# Patient Record
Sex: MALE | Race: WHITE | NOT HISPANIC OR LATINO | Employment: FULL TIME | URBAN - METROPOLITAN AREA
[De-identification: names, ages, dates, MRNs, and addresses within clinical notes are randomized per-mention and may not be internally consistent; named-entity substitution may affect disease eponyms.]

---

## 2017-01-24 DIAGNOSIS — I10 BENIGN ESSENTIAL HYPERTENSION: ICD-10-CM

## 2017-01-24 RX ORDER — HYDROCHLOROTHIAZIDE 12.5 MG/1
CAPSULE, GELATIN COATED ORAL
Qty: 30 CAPSULE | Refills: 5 | Status: SHIPPED | OUTPATIENT
Start: 2017-01-24 | End: 2017-07-25 | Stop reason: SDUPTHER

## 2017-03-21 DIAGNOSIS — I10 BENIGN ESSENTIAL HYPERTENSION: ICD-10-CM

## 2017-03-21 RX ORDER — METOPROLOL SUCCINATE 100 MG/1
TABLET, EXTENDED RELEASE ORAL
Qty: 30 TABLET | Refills: 1 | Status: SHIPPED | OUTPATIENT
Start: 2017-03-21 | End: 2017-05-24 | Stop reason: SDUPTHER

## 2017-05-15 ENCOUNTER — OFFICE VISIT (OUTPATIENT)
Dept: INTERNAL MEDICINE | Age: 57
End: 2017-05-15

## 2017-05-15 VITALS
BODY MASS INDEX: 29.97 KG/M2 | WEIGHT: 241 LBS | HEIGHT: 75 IN | HEART RATE: 72 BPM | DIASTOLIC BLOOD PRESSURE: 80 MMHG | SYSTOLIC BLOOD PRESSURE: 138 MMHG | RESPIRATION RATE: 12 BRPM

## 2017-05-15 DIAGNOSIS — E78.2 MIXED HYPERLIPIDEMIA: ICD-10-CM

## 2017-05-15 DIAGNOSIS — E03.9 ACQUIRED HYPOTHYROIDISM: ICD-10-CM

## 2017-05-15 DIAGNOSIS — I10 BENIGN ESSENTIAL HYPERTENSION: Primary | ICD-10-CM

## 2017-05-15 DIAGNOSIS — R73.9 HYPERGLYCEMIA: ICD-10-CM

## 2017-05-15 LAB
ALBUMIN SERPL-MCNC: 4.3 G/DL (ref 3.5–5.2)
ALBUMIN/GLOB SERPL: 1.7 G/DL
ALP SERPL-CCNC: 63 U/L (ref 39–117)
ALT SERPL-CCNC: 30 U/L (ref 1–41)
AST SERPL-CCNC: 31 U/L (ref 1–40)
BILIRUB SERPL-MCNC: 0.7 MG/DL (ref 0.1–1.2)
BUN SERPL-MCNC: 12 MG/DL (ref 6–20)
BUN/CREAT SERPL: 9.4 (ref 7–25)
CALCIUM SERPL-MCNC: 9.3 MG/DL (ref 8.6–10.5)
CHLORIDE SERPL-SCNC: 101 MMOL/L (ref 98–107)
CHOLEST SERPL-MCNC: 142 MG/DL (ref 0–200)
CO2 SERPL-SCNC: 30.2 MMOL/L (ref 22–29)
CREAT SERPL-MCNC: 1.27 MG/DL (ref 0.76–1.27)
GLOBULIN SER CALC-MCNC: 2.6 GM/DL
GLUCOSE SERPL-MCNC: 105 MG/DL (ref 65–99)
HBA1C MFR BLD: 5.31 % (ref 4.8–5.6)
HDLC SERPL-MCNC: 36 MG/DL (ref 40–60)
LDLC SERPL CALC-MCNC: 85 MG/DL (ref 0–100)
POTASSIUM SERPL-SCNC: 4.2 MMOL/L (ref 3.5–5.2)
PROT SERPL-MCNC: 6.9 G/DL (ref 6–8.5)
SODIUM SERPL-SCNC: 142 MMOL/L (ref 136–145)
T4 FREE SERPL-MCNC: 0.93 NG/DL (ref 0.93–1.7)
TRIGL SERPL-MCNC: 104 MG/DL (ref 0–150)
TSH SERPL DL<=0.005 MIU/L-ACNC: 0.6 MIU/ML (ref 0.27–4.2)
VLDLC SERPL CALC-MCNC: 20.8 MG/DL (ref 5–40)

## 2017-05-15 PROCEDURE — 99214 OFFICE O/P EST MOD 30 MIN: CPT | Performed by: INTERNAL MEDICINE

## 2017-05-24 DIAGNOSIS — I10 BENIGN ESSENTIAL HYPERTENSION: ICD-10-CM

## 2017-05-24 RX ORDER — METOPROLOL SUCCINATE 100 MG/1
TABLET, EXTENDED RELEASE ORAL
Qty: 30 TABLET | Refills: 5 | Status: SHIPPED | OUTPATIENT
Start: 2017-05-24 | End: 2017-12-04 | Stop reason: SDUPTHER

## 2017-07-25 DIAGNOSIS — I10 BENIGN ESSENTIAL HYPERTENSION: ICD-10-CM

## 2017-07-25 RX ORDER — HYDROCHLOROTHIAZIDE 12.5 MG/1
CAPSULE, GELATIN COATED ORAL
Qty: 30 CAPSULE | Refills: 4 | Status: SHIPPED | OUTPATIENT
Start: 2017-07-25 | End: 2017-12-21 | Stop reason: SDUPTHER

## 2017-11-13 RX ORDER — PRAVASTATIN SODIUM 40 MG
TABLET ORAL
Qty: 30 TABLET | Refills: 4 | Status: SHIPPED | OUTPATIENT
Start: 2017-11-13 | End: 2018-07-24 | Stop reason: SDUPTHER

## 2017-11-15 ENCOUNTER — OFFICE VISIT (OUTPATIENT)
Dept: INTERNAL MEDICINE | Age: 57
End: 2017-11-15

## 2017-11-15 VITALS
SYSTOLIC BLOOD PRESSURE: 138 MMHG | TEMPERATURE: 96.8 F | HEIGHT: 75 IN | OXYGEN SATURATION: 97 % | DIASTOLIC BLOOD PRESSURE: 80 MMHG | BODY MASS INDEX: 29.22 KG/M2 | HEART RATE: 72 BPM | WEIGHT: 235 LBS | RESPIRATION RATE: 13 BRPM

## 2017-11-15 DIAGNOSIS — E03.9 ACQUIRED HYPOTHYROIDISM: ICD-10-CM

## 2017-11-15 DIAGNOSIS — I10 BENIGN ESSENTIAL HYPERTENSION: Primary | ICD-10-CM

## 2017-11-15 DIAGNOSIS — R73.9 HYPERGLYCEMIA: ICD-10-CM

## 2017-11-15 DIAGNOSIS — E78.2 MIXED HYPERLIPIDEMIA: ICD-10-CM

## 2017-11-15 LAB
ALBUMIN SERPL-MCNC: 4.5 G/DL (ref 3.5–5.2)
ALBUMIN/GLOB SERPL: 1.7 G/DL
ALP SERPL-CCNC: 73 U/L (ref 39–117)
ALT SERPL-CCNC: 40 U/L (ref 1–41)
AST SERPL-CCNC: 36 U/L (ref 1–40)
BILIRUB SERPL-MCNC: 1 MG/DL (ref 0.1–1.2)
BUN SERPL-MCNC: 14 MG/DL (ref 6–20)
BUN/CREAT SERPL: 10.4 (ref 7–25)
CALCIUM SERPL-MCNC: 10.1 MG/DL (ref 8.6–10.5)
CHLORIDE SERPL-SCNC: 100 MMOL/L (ref 98–107)
CHOLEST SERPL-MCNC: 134 MG/DL (ref 0–200)
CO2 SERPL-SCNC: 30.6 MMOL/L (ref 22–29)
CREAT SERPL-MCNC: 1.34 MG/DL (ref 0.76–1.27)
GFR SERPLBLD CREATININE-BSD FMLA CKD-EPI: 55 ML/MIN/1.73
GFR SERPLBLD CREATININE-BSD FMLA CKD-EPI: 67 ML/MIN/1.73
GLOBULIN SER CALC-MCNC: 2.7 GM/DL
GLUCOSE SERPL-MCNC: 98 MG/DL (ref 65–99)
HBA1C MFR BLD: 5.51 % (ref 4.8–5.6)
HDLC SERPL-MCNC: 37 MG/DL (ref 40–60)
LDLC SERPL CALC-MCNC: 76 MG/DL (ref 0–100)
POTASSIUM SERPL-SCNC: 4.6 MMOL/L (ref 3.5–5.2)
PROT SERPL-MCNC: 7.2 G/DL (ref 6–8.5)
SODIUM SERPL-SCNC: 143 MMOL/L (ref 136–145)
T4 FREE SERPL-MCNC: 1.02 NG/DL (ref 0.93–1.7)
TRIGL SERPL-MCNC: 107 MG/DL (ref 0–150)
TSH SERPL DL<=0.005 MIU/L-ACNC: 0.36 MIU/ML (ref 0.27–4.2)
VLDLC SERPL CALC-MCNC: 21.4 MG/DL (ref 5–40)

## 2017-11-15 PROCEDURE — 99214 OFFICE O/P EST MOD 30 MIN: CPT | Performed by: INTERNAL MEDICINE

## 2017-11-15 NOTE — PROGRESS NOTES
"  Eros Ellis is a 56 y.o. male who presents with   Chief Complaint   Patient presents with   • Hypertension     Checkup; lab updates   • Hyperlipidemia     As above   • Hypothyroidism     As above   • Blood Sugar Problem     Mild elevation of the blood sugar in the past but currently he is on no oral hypoglycemic agents.  He is attempting to control his lipids and blood sugar with weight loss.  He is going to an organization called\"[facility]\" and since May his weight has decreased from 241 pounds to his current weight of 235 pounds.  His goal weight is 215 pounds for his height of 6 feet 3.    .    Hypertension   This is a chronic problem. The current episode started more than 1 year ago. The problem is controlled. Past treatments include diuretics and beta blockers. The current treatment provides moderate improvement. There are no compliance problems.    Hyperlipidemia   This is a chronic problem. The current episode started more than 1 year ago. The problem is controlled. Exacerbating diseases include diabetes and hypothyroidism. Current antihyperlipidemic treatment includes statins. The current treatment provides moderate improvement of lipids. There are no compliance problems.    Hypothyroidism   This is a chronic problem. The current episode started more than 1 year ago. The problem has been unchanged. Treatments tried: Current treatment is well-tolerated.   Blood Sugar Problem   This is a chronic problem. The current episode started more than 1 year ago. The problem has been waxing and waning. Treatments tried: Weight control.        The following portions of the patient's history were reviewed and updated as appropriate: allergies, current medications, past medical history and problem list.    Review of Systems   Constitutional: Negative.    HENT: Negative.    Eyes: Negative.    Respiratory: Negative.    Cardiovascular: Negative.    Genitourinary: Negative.    Musculoskeletal: Negative.  " "  Skin: Negative.    Neurological: Negative.    Psychiatric/Behavioral: Negative.        Objective   Physical Exam   Constitutional: He is oriented to person, place, and time. He appears well-developed and well-nourished. No distress.   HENT:   Head: Normocephalic and atraumatic.   Eyes: Conjunctivae and EOM are normal. Pupils are equal, round, and reactive to light.   Neck: Normal range of motion. Neck supple. No thyromegaly present.   Neck exam negative.  Carotid auscultation normal-no bruits heard.   Cardiovascular: Normal rate, regular rhythm, normal heart sounds and intact distal pulses.  Exam reveals no gallop and no friction rub.    No murmur heard.  Pulmonary/Chest: Effort normal and breath sounds normal. No respiratory distress. He has no wheezes. He has no rales. He exhibits no tenderness.   Neurological: He is alert and oriented to person, place, and time.   Psychiatric: He has a normal mood and affect. His behavior is normal. Judgment and thought content normal.   Nursing note and vitals reviewed.      Assessment/Plan   Eros was seen today for hypertension, hyperlipidemia, hypothyroidism and blood sugar problem.    Diagnoses and all orders for this visit:    Benign essential hypertension  -     Comprehensive Metabolic Panel  -     TSH+Free T4    Mixed hyperlipidemia  -     Comprehensive Metabolic Panel  -     Lipid Panel  -     TSH+Free T4    Acquired hypothyroidism  -     Comprehensive Metabolic Panel  -     Lipid Panel  -     TSH+Free T4    Hyperglycemia  -     Comprehensive Metabolic Panel  -     Hemoglobin A1c        Plan: Labs as above.Today's exam unremarkable for any new problems or events.  Continue all current treatment as prescribed.  A follow-up checkup/lab update visit is advised for 6 months.    The patient says he had labs including a PSA recently at \"[facility]\" ( his weight loss organization that he is going to) but he wants to go ahead and do labs today.  He said his recent " "PSA was \"normal\".    His ultimate goal is to lose enough weight where he may be able to come off of his blood pressure medication and his statin therapy.       "

## 2017-11-16 NOTE — PROGRESS NOTES
All labs are within normal / acceptable ranges. Continue all current meds as they are. A followup visit to be seen and have labs updated in six months is advised. We will discuss possibly discontinuing some meds next visit.

## 2017-12-04 DIAGNOSIS — I10 BENIGN ESSENTIAL HYPERTENSION: ICD-10-CM

## 2017-12-04 RX ORDER — METOPROLOL SUCCINATE 100 MG/1
TABLET, EXTENDED RELEASE ORAL
Qty: 30 TABLET | Refills: 4 | Status: SHIPPED | OUTPATIENT
Start: 2017-12-04 | End: 2018-05-15 | Stop reason: SDUPTHER

## 2017-12-21 DIAGNOSIS — I10 BENIGN ESSENTIAL HYPERTENSION: ICD-10-CM

## 2017-12-21 RX ORDER — HYDROCHLOROTHIAZIDE 12.5 MG/1
CAPSULE, GELATIN COATED ORAL
Qty: 30 CAPSULE | Refills: 3 | Status: SHIPPED | OUTPATIENT
Start: 2017-12-21 | End: 2018-04-19 | Stop reason: SDUPTHER

## 2018-04-19 DIAGNOSIS — I10 BENIGN ESSENTIAL HYPERTENSION: ICD-10-CM

## 2018-04-19 RX ORDER — HYDROCHLOROTHIAZIDE 12.5 MG/1
CAPSULE, GELATIN COATED ORAL
Qty: 30 CAPSULE | Refills: 2 | Status: SHIPPED | OUTPATIENT
Start: 2018-04-19 | End: 2018-07-19 | Stop reason: SDUPTHER

## 2018-05-15 DIAGNOSIS — I10 BENIGN ESSENTIAL HYPERTENSION: ICD-10-CM

## 2018-05-15 RX ORDER — METOPROLOL SUCCINATE 100 MG/1
TABLET, EXTENDED RELEASE ORAL
Qty: 30 TABLET | Refills: 3 | Status: SHIPPED | OUTPATIENT
Start: 2018-05-15 | End: 2018-10-01 | Stop reason: SDUPTHER

## 2018-05-23 ENCOUNTER — OFFICE VISIT (OUTPATIENT)
Dept: INTERNAL MEDICINE | Age: 58
End: 2018-05-23

## 2018-05-23 VITALS
BODY MASS INDEX: 29.47 KG/M2 | OXYGEN SATURATION: 98 % | HEIGHT: 75 IN | WEIGHT: 237 LBS | DIASTOLIC BLOOD PRESSURE: 80 MMHG | SYSTOLIC BLOOD PRESSURE: 130 MMHG | HEART RATE: 61 BPM | RESPIRATION RATE: 13 BRPM | TEMPERATURE: 97 F

## 2018-05-23 DIAGNOSIS — E78.2 MIXED HYPERLIPIDEMIA: ICD-10-CM

## 2018-05-23 DIAGNOSIS — R35.1 NOCTURIA: ICD-10-CM

## 2018-05-23 DIAGNOSIS — I10 BENIGN ESSENTIAL HYPERTENSION: Primary | ICD-10-CM

## 2018-05-23 DIAGNOSIS — R73.9 HYPERGLYCEMIA: ICD-10-CM

## 2018-05-23 LAB
ALBUMIN SERPL-MCNC: 4.5 G/DL (ref 3.5–5.2)
ALBUMIN/GLOB SERPL: 2 G/DL
ALP SERPL-CCNC: 71 U/L (ref 39–117)
ALT SERPL-CCNC: 29 U/L (ref 1–41)
AST SERPL-CCNC: 26 U/L (ref 1–40)
BILIRUB SERPL-MCNC: 0.8 MG/DL (ref 0.1–1.2)
BUN SERPL-MCNC: 14 MG/DL (ref 6–20)
BUN/CREAT SERPL: 10.8 (ref 7–25)
CALCIUM SERPL-MCNC: 9.4 MG/DL (ref 8.6–10.5)
CHLORIDE SERPL-SCNC: 101 MMOL/L (ref 98–107)
CHOLEST SERPL-MCNC: 170 MG/DL (ref 0–200)
CO2 SERPL-SCNC: 29 MMOL/L (ref 22–29)
CREAT SERPL-MCNC: 1.3 MG/DL (ref 0.76–1.27)
GFR SERPLBLD CREATININE-BSD FMLA CKD-EPI: 57 ML/MIN/1.73
GFR SERPLBLD CREATININE-BSD FMLA CKD-EPI: 69 ML/MIN/1.73
GLOBULIN SER CALC-MCNC: 2.3 GM/DL
GLUCOSE SERPL-MCNC: 99 MG/DL (ref 65–99)
HBA1C MFR BLD: 5.6 % (ref 4.8–5.6)
HDLC SERPL-MCNC: 39 MG/DL (ref 40–60)
LDLC SERPL CALC-MCNC: 111 MG/DL (ref 0–100)
POTASSIUM SERPL-SCNC: 4.7 MMOL/L (ref 3.5–5.2)
PROT SERPL-MCNC: 6.8 G/DL (ref 6–8.5)
PSA SERPL-MCNC: 1.16 NG/ML (ref 0–4)
SODIUM SERPL-SCNC: 141 MMOL/L (ref 136–145)
TRIGL SERPL-MCNC: 99 MG/DL (ref 0–150)
VLDLC SERPL CALC-MCNC: 19.8 MG/DL (ref 5–40)

## 2018-05-23 PROCEDURE — 99214 OFFICE O/P EST MOD 30 MIN: CPT | Performed by: INTERNAL MEDICINE

## 2018-05-23 NOTE — PROGRESS NOTES
"  Eros Ellis is a 57 y.o. male who presents with   Chief Complaint   Patient presents with   • Hypertension   • Hyperlipidemia   • Blood Sugar Problem   • Nocturia   .    57-year-old male presents for his six-month checkup/lab update visit.  He tells me that for the past 2 years he has been seeing a facility called \"[facility]".  He has been going there for times a year and they are doing lab tests each time he goes.  He says they are prescribing his [place] Thyroid and his testosterone for him and what they are doing for him is helping and the sense that he feels better and feels more energetic.  I have suggested to him that he should bring copies of labs with him when he comes each time so that we would not be redundant in testing the same labs that they are testing.      Hypertension   This is a chronic problem. The current episode started more than 1 year ago. The problem is controlled. Current antihypertension treatment includes diuretics and beta blockers. The current treatment provides moderate improvement. There are no compliance problems.    Hyperlipidemia   This is a chronic problem. The current episode started more than 1 year ago. The problem is controlled. Recent lipid tests were reviewed and are normal. Exacerbating diseases include diabetes. Current antihyperlipidemic treatment includes statins. The current treatment provides moderate improvement of lipids. There are no compliance problems.    Blood Sugar Problem   This is a chronic problem. The current episode started more than 1 year ago. The problem has been resolved. He has tried nothing for the symptoms.      Nocturia: Nighttime urinary frequency-dependent upon fluid consumption prior to bedtime.    The following portions of the patient's history were reviewed and updated as appropriate: allergies, current medications, past medical history and problem list.    Review of Systems   Constitutional: Negative.    HENT: Negative.    Eyes: Negative. " "   Respiratory: Negative.    Cardiovascular: Negative.    Genitourinary: Negative.    Musculoskeletal: Negative.    Skin: Negative.    Neurological: Negative.    Psychiatric/Behavioral: Negative.        Objective   Physical Exam   Constitutional: He is oriented to person, place, and time. He appears well-developed and well-nourished. No distress.   HENT:   Head: Normocephalic and atraumatic.   Eyes: Conjunctivae and EOM are normal. Pupils are equal, round, and reactive to light.   Neck: Normal range of motion. Neck supple. No thyromegaly present.   Neck exam negative.  Carotid auscultation normal-no bruits heard.   Cardiovascular: Normal rate, regular rhythm, normal heart sounds and intact distal pulses.  Exam reveals no gallop and no friction rub.    No murmur heard.  Pulmonary/Chest: Effort normal and breath sounds normal. No respiratory distress. He has no wheezes. He has no rales. He exhibits no tenderness.   Neurological: He is alert and oriented to person, place, and time.   Psychiatric: He has a normal mood and affect. His behavior is normal. Judgment and thought content normal.   Nursing note and vitals reviewed.      Assessment/Plan   Eros was seen today for hypertension, hyperlipidemia, blood sugar problem and nocturia.    Diagnoses and all orders for this visit:    Benign essential hypertension  -     Comprehensive Metabolic Panel    Mixed hyperlipidemia  -     Comprehensive Metabolic Panel  -     Lipid Panel    Nocturia  -     PSA DIAGNOSTIC    Hyperglycemia  -     Comprehensive Metabolic Panel  -     Hemoglobin A1c        Plan: Labs as above.Today's exam unremarkable for any new problems or events.  Continue all current treatment as prescribed.  A follow-up checkup/lab update visit is advised for 6 months assuming today's labs are all acceptable.  Advice as above regarding outside labs being done at \"[facility]\"           "

## 2018-07-19 DIAGNOSIS — I10 BENIGN ESSENTIAL HYPERTENSION: ICD-10-CM

## 2018-07-19 RX ORDER — HYDROCHLOROTHIAZIDE 12.5 MG/1
CAPSULE, GELATIN COATED ORAL
Qty: 30 CAPSULE | Refills: 1 | Status: SHIPPED | OUTPATIENT
Start: 2018-07-19 | End: 2018-09-18 | Stop reason: SDUPTHER

## 2018-07-25 RX ORDER — PRAVASTATIN SODIUM 40 MG
TABLET ORAL
Qty: 30 TABLET | Refills: 3 | Status: SHIPPED | OUTPATIENT
Start: 2018-07-25 | End: 2019-07-02 | Stop reason: SDUPTHER

## 2018-08-09 RX ORDER — PRAVASTATIN SODIUM 40 MG
TABLET ORAL
Qty: 30 TABLET | Refills: 3 | Status: SHIPPED | OUTPATIENT
Start: 2018-08-09 | End: 2018-11-21 | Stop reason: SDUPTHER

## 2018-09-18 DIAGNOSIS — I10 BENIGN ESSENTIAL HYPERTENSION: ICD-10-CM

## 2018-09-18 RX ORDER — HYDROCHLOROTHIAZIDE 12.5 MG/1
CAPSULE, GELATIN COATED ORAL
Qty: 30 CAPSULE | Refills: 0 | Status: SHIPPED | OUTPATIENT
Start: 2018-09-18 | End: 2018-10-27 | Stop reason: SDUPTHER

## 2018-10-01 DIAGNOSIS — I10 BENIGN ESSENTIAL HYPERTENSION: ICD-10-CM

## 2018-10-01 RX ORDER — METOPROLOL SUCCINATE 100 MG/1
TABLET, EXTENDED RELEASE ORAL
Qty: 30 TABLET | Refills: 2 | Status: SHIPPED | OUTPATIENT
Start: 2018-10-01 | End: 2019-01-04 | Stop reason: SDUPTHER

## 2018-10-27 DIAGNOSIS — I10 BENIGN ESSENTIAL HYPERTENSION: ICD-10-CM

## 2018-10-29 RX ORDER — HYDROCHLOROTHIAZIDE 12.5 MG/1
CAPSULE, GELATIN COATED ORAL
Qty: 30 CAPSULE | Refills: 5 | Status: SHIPPED | OUTPATIENT
Start: 2018-10-29 | End: 2019-04-29 | Stop reason: SDUPTHER

## 2018-11-21 ENCOUNTER — OFFICE VISIT (OUTPATIENT)
Dept: INTERNAL MEDICINE | Age: 58
End: 2018-11-21

## 2018-11-21 VITALS
HEIGHT: 75 IN | BODY MASS INDEX: 29.84 KG/M2 | HEART RATE: 60 BPM | DIASTOLIC BLOOD PRESSURE: 90 MMHG | TEMPERATURE: 97.1 F | OXYGEN SATURATION: 97 % | SYSTOLIC BLOOD PRESSURE: 136 MMHG | RESPIRATION RATE: 13 BRPM | WEIGHT: 240 LBS

## 2018-11-21 DIAGNOSIS — E78.2 MIXED HYPERLIPIDEMIA: ICD-10-CM

## 2018-11-21 DIAGNOSIS — R73.9 HYPERGLYCEMIA: ICD-10-CM

## 2018-11-21 DIAGNOSIS — I10 BENIGN ESSENTIAL HYPERTENSION: Primary | ICD-10-CM

## 2018-11-21 DIAGNOSIS — E03.9 ACQUIRED HYPOTHYROIDISM: ICD-10-CM

## 2018-11-21 DIAGNOSIS — J01.10 ACUTE NON-RECURRENT FRONTAL SINUSITIS: ICD-10-CM

## 2018-11-21 DIAGNOSIS — Z23 NEED FOR INFLUENZA VACCINATION: ICD-10-CM

## 2018-11-21 LAB
ALBUMIN SERPL-MCNC: 4.4 G/DL (ref 3.5–5.2)
ALBUMIN/GLOB SERPL: 1.8 G/DL
ALP SERPL-CCNC: 73 U/L (ref 39–117)
ALT SERPL-CCNC: 36 U/L (ref 1–41)
AST SERPL-CCNC: 37 U/L (ref 1–40)
BILIRUB SERPL-MCNC: 0.9 MG/DL (ref 0.1–1.2)
BUN SERPL-MCNC: 16 MG/DL (ref 6–20)
BUN/CREAT SERPL: 12.1 (ref 7–25)
CALCIUM SERPL-MCNC: 9.8 MG/DL (ref 8.6–10.5)
CHLORIDE SERPL-SCNC: 103 MMOL/L (ref 98–107)
CHOLEST SERPL-MCNC: 130 MG/DL (ref 0–200)
CO2 SERPL-SCNC: 31.2 MMOL/L (ref 22–29)
CREAT SERPL-MCNC: 1.32 MG/DL (ref 0.76–1.27)
GLOBULIN SER CALC-MCNC: 2.5 GM/DL
GLUCOSE SERPL-MCNC: 100 MG/DL (ref 65–99)
HBA1C MFR BLD: 5.67 % (ref 4.8–5.6)
HDLC SERPL-MCNC: 40 MG/DL (ref 40–60)
LDLC SERPL CALC-MCNC: 73 MG/DL (ref 0–100)
POTASSIUM SERPL-SCNC: 4.6 MMOL/L (ref 3.5–5.2)
PROT SERPL-MCNC: 6.9 G/DL (ref 6–8.5)
SODIUM SERPL-SCNC: 144 MMOL/L (ref 136–145)
T4 FREE SERPL-MCNC: 0.94 NG/DL (ref 0.93–1.7)
TRIGL SERPL-MCNC: 86 MG/DL (ref 0–150)
TSH SERPL DL<=0.005 MIU/L-ACNC: 0.48 MIU/ML (ref 0.27–4.2)
VLDLC SERPL CALC-MCNC: 17.2 MG/DL (ref 5–40)

## 2018-11-21 PROCEDURE — 90674 CCIIV4 VAC NO PRSV 0.5 ML IM: CPT | Performed by: INTERNAL MEDICINE

## 2018-11-21 PROCEDURE — 99214 OFFICE O/P EST MOD 30 MIN: CPT | Performed by: INTERNAL MEDICINE

## 2018-11-21 PROCEDURE — 90471 IMMUNIZATION ADMIN: CPT | Performed by: INTERNAL MEDICINE

## 2018-11-21 RX ORDER — AMOXICILLIN 500 MG/1
CAPSULE ORAL
Qty: 14 CAPSULE | Refills: 0 | Status: SHIPPED | OUTPATIENT
Start: 2018-11-21 | End: 2019-05-29

## 2018-11-21 NOTE — PROGRESS NOTES
"  Eros Ellis is a 57 y.o. male who presents with   Chief Complaint   Patient presents with   • Hypertension   • Hyperlipidemia   • Hypothyroidism   • Blood Sugar Problem   • Sinusitis   .    57-year-old male.  Six-month checkup/lab update visit.  Unrelated reason for today's visit-cough, head congestion, drainage, yellow phlegm, no fever, no shortness of breath, no wheezing or chills.  All symptoms present about 5-7 days he says.\"  Can't seem to shake it\"      Hypertension   This is a chronic problem. The current episode started more than 1 year ago. The problem has been waxing and waning since onset. Current antihypertension treatment includes diuretics and beta blockers. The current treatment provides mild improvement. Compliance problems include diet and exercise.    Hyperlipidemia   This is a chronic problem. The current episode started more than 1 year ago. The problem is controlled. Exacerbating diseases include diabetes and hypothyroidism. Current antihyperlipidemic treatment includes statins. The current treatment provides moderate improvement of lipids. Compliance problems include adherence to diet and adherence to exercise.    Hypothyroidism   This is a chronic problem. The current episode started more than 1 year ago. The problem has been unchanged. Associated symptoms include congestion and coughing. Treatments tried: Currently taking [place] thyroid.   Blood Sugar Problem   This is a chronic problem. The current episode started more than 1 year ago. The problem has been waxing and waning. Associated symptoms include congestion and coughing. He has tried nothing for the symptoms.   Sinusitis   This is a new problem. The current episode started in the past 7 days. The problem has been gradually improving since onset. There has been no fever. Associated symptoms include congestion, coughing and sinus pressure. Past treatments include nothing.        The following portions of the patient's history " were reviewed and updated as appropriate: allergies, current medications, past medical history and problem list.    Review of Systems   Constitutional: Negative.    HENT: Positive for congestion, postnasal drip and sinus pressure.    Eyes: Negative.    Respiratory: Positive for cough.    Cardiovascular: Negative.    Genitourinary: Negative.    Musculoskeletal: Negative.    Skin: Negative.    Neurological: Negative.    Psychiatric/Behavioral: Negative.        Objective   Physical Exam   Constitutional: He is oriented to person, place, and time. He appears well-developed and well-nourished. No distress.   57-year-old overweight male.  At his height of 6 feet 3 his goal weight is 215 pounds.  His current weight is 240 pounds.  He said he is going to attempt weight loss on his own as he realizes that he needs to lose weight   HENT:   Head: Normocephalic and atraumatic.   Right Ear: External ear normal.   Left Ear: External ear normal.   Nose: Nose normal.   Mouth/Throat: Oropharynx is clear and moist. No oropharyngeal exudate.   Eyes: Conjunctivae and EOM are normal. Pupils are equal, round, and reactive to light.   Neck: Normal range of motion. Neck supple. No thyromegaly present.   Neck exam negative.  Carotid auscultation normal-no bruits heard.   Cardiovascular: Normal rate, regular rhythm, normal heart sounds and intact distal pulses. Exam reveals no gallop and no friction rub.   No murmur heard.  Pulmonary/Chest: Effort normal and breath sounds normal. No respiratory distress. He has no wheezes. He has no rales. He exhibits no tenderness.   Neurological: He is alert and oriented to person, place, and time.   Psychiatric: He has a normal mood and affect. His behavior is normal. Judgment and thought content normal.   Nursing note and vitals reviewed.      Assessment/Plan   Eros was seen today for hypertension, hyperlipidemia, hypothyroidism, blood sugar problem and sinusitis.    Diagnoses and all orders for this  "visit:    Benign essential hypertension  -     Comprehensive Metabolic Panel    Mixed hyperlipidemia  -     Comprehensive Metabolic Panel  -     Lipid Panel    Acquired hypothyroidism  -     TSH+Free T4    Hyperglycemia  -     Comprehensive Metabolic Panel  -     Hemoglobin A1c    Acute non-recurrent frontal sinusitis  -     amoxicillin (AMOXIL) 500 MG capsule; Take one capsule every 12 hours until all are gone for upper/lower respiratory tract infection        Plan: Labs as above for the issues as outlined.  His blood pressure is mildly elevated at 136/90.  He had been on Diovan HCT in the past but Diovan was discontinued because of suspected renal effects with diminished GFR.  He says that at home his blood pressure is consistently\" around 130/90\".  He also has noticed that his blood pressure is trending a little bit higher since the Diovan has been removed from the prescription mix.    He will monitor his blood pressure twice a day at home trying to keep it in a range between 110-130/60-80  Consistently.  If it stays this way than we will see him in 6 months for a checkup/lab update visit assuming today's labs are acceptable.  If not then we will see him in about 4-6 weeks for further evaluation of the blood pressure.    His clinical history of URI symptoms suggests a combination of allergy with possibly some infection associated with it given the discoloration of his mucus.  We will try him on amoxicillin 500 mg twice a day for one week.  Follow-up for this issue will be as needed.       "

## 2019-01-04 DIAGNOSIS — I10 BENIGN ESSENTIAL HYPERTENSION: ICD-10-CM

## 2019-01-04 RX ORDER — METOPROLOL SUCCINATE 100 MG/1
TABLET, EXTENDED RELEASE ORAL
Qty: 30 TABLET | Refills: 1 | Status: SHIPPED | OUTPATIENT
Start: 2019-01-04 | End: 2019-03-03 | Stop reason: SDUPTHER

## 2019-03-03 DIAGNOSIS — I10 BENIGN ESSENTIAL HYPERTENSION: ICD-10-CM

## 2019-03-04 RX ORDER — METOPROLOL SUCCINATE 100 MG/1
TABLET, EXTENDED RELEASE ORAL
Qty: 30 TABLET | Refills: 0 | Status: SHIPPED | OUTPATIENT
Start: 2019-03-04 | End: 2019-03-29 | Stop reason: SDUPTHER

## 2019-03-29 DIAGNOSIS — I10 BENIGN ESSENTIAL HYPERTENSION: ICD-10-CM

## 2019-03-29 RX ORDER — METOPROLOL SUCCINATE 100 MG/1
TABLET, EXTENDED RELEASE ORAL
Qty: 30 TABLET | Refills: 0 | Status: SHIPPED | OUTPATIENT
Start: 2019-03-29 | End: 2019-05-04 | Stop reason: SDUPTHER

## 2019-04-29 DIAGNOSIS — I10 BENIGN ESSENTIAL HYPERTENSION: ICD-10-CM

## 2019-04-29 RX ORDER — HYDROCHLOROTHIAZIDE 12.5 MG/1
CAPSULE, GELATIN COATED ORAL
Qty: 30 CAPSULE | Refills: 4 | Status: SHIPPED | OUTPATIENT
Start: 2019-04-29 | End: 2019-09-26 | Stop reason: SDUPTHER

## 2019-05-04 DIAGNOSIS — I10 BENIGN ESSENTIAL HYPERTENSION: ICD-10-CM

## 2019-05-06 RX ORDER — METOPROLOL SUCCINATE 100 MG/1
TABLET, EXTENDED RELEASE ORAL
Qty: 30 TABLET | Refills: 5 | Status: SHIPPED | OUTPATIENT
Start: 2019-05-06 | End: 2019-11-01 | Stop reason: SDUPTHER

## 2019-05-29 ENCOUNTER — OFFICE VISIT (OUTPATIENT)
Dept: INTERNAL MEDICINE | Age: 59
End: 2019-05-29

## 2019-05-29 VITALS
TEMPERATURE: 97 F | DIASTOLIC BLOOD PRESSURE: 76 MMHG | OXYGEN SATURATION: 97 % | SYSTOLIC BLOOD PRESSURE: 110 MMHG | HEIGHT: 75 IN | HEART RATE: 55 BPM | WEIGHT: 235.8 LBS | RESPIRATION RATE: 13 BRPM | BODY MASS INDEX: 29.32 KG/M2

## 2019-05-29 DIAGNOSIS — E03.9 ACQUIRED HYPOTHYROIDISM: ICD-10-CM

## 2019-05-29 DIAGNOSIS — Z12.11 SCREEN FOR COLON CANCER: Primary | ICD-10-CM

## 2019-05-29 DIAGNOSIS — I10 BENIGN ESSENTIAL HYPERTENSION: Primary | ICD-10-CM

## 2019-05-29 DIAGNOSIS — E78.2 MIXED HYPERLIPIDEMIA: ICD-10-CM

## 2019-05-29 DIAGNOSIS — R73.9 HYPERGLYCEMIA: ICD-10-CM

## 2019-05-29 LAB
ALBUMIN SERPL-MCNC: 4.5 G/DL (ref 3.5–5.2)
ALBUMIN/GLOB SERPL: 1.9 G/DL
ALP SERPL-CCNC: 77 U/L (ref 39–117)
ALT SERPL-CCNC: 28 U/L (ref 1–41)
AST SERPL-CCNC: 26 U/L (ref 1–40)
BILIRUB SERPL-MCNC: 0.8 MG/DL (ref 0.2–1.2)
BUN SERPL-MCNC: 14 MG/DL (ref 6–20)
BUN/CREAT SERPL: 11.6 (ref 7–25)
CALCIUM SERPL-MCNC: 9.9 MG/DL (ref 8.6–10.5)
CHLORIDE SERPL-SCNC: 103 MMOL/L (ref 98–107)
CHOLEST SERPL-MCNC: 121 MG/DL (ref 0–200)
CO2 SERPL-SCNC: 29.6 MMOL/L (ref 22–29)
CREAT SERPL-MCNC: 1.21 MG/DL (ref 0.76–1.27)
GLOBULIN SER CALC-MCNC: 2.4 GM/DL
GLUCOSE SERPL-MCNC: 91 MG/DL (ref 65–99)
HBA1C MFR BLD: 5.5 % (ref 4.8–5.6)
HDLC SERPL-MCNC: 36 MG/DL (ref 40–60)
LDLC SERPL CALC-MCNC: 69 MG/DL (ref 0–100)
POTASSIUM SERPL-SCNC: 4.3 MMOL/L (ref 3.5–5.2)
PROT SERPL-MCNC: 6.9 G/DL (ref 6–8.5)
SODIUM SERPL-SCNC: 143 MMOL/L (ref 136–145)
T4 FREE SERPL-MCNC: 1.03 NG/DL (ref 0.93–1.7)
TRIGL SERPL-MCNC: 80 MG/DL (ref 0–150)
TSH SERPL DL<=0.005 MIU/L-ACNC: 0.54 MIU/ML (ref 0.27–4.2)
VLDLC SERPL CALC-MCNC: 16 MG/DL

## 2019-05-29 PROCEDURE — 99214 OFFICE O/P EST MOD 30 MIN: CPT | Performed by: INTERNAL MEDICINE

## 2019-05-29 RX ORDER — SILDENAFIL CITRATE 20 MG/1
TABLET ORAL
Refills: 0 | COMMUNITY
Start: 2019-04-17

## 2019-05-29 RX ORDER — PRASTERONE (DHEA) 25 MG
CAPSULE ORAL
Refills: 5 | COMMUNITY
Start: 2019-05-06 | End: 2021-07-20

## 2019-05-29 NOTE — PROGRESS NOTES
Eros Ellis is a 58 y.o. male who presents with   Chief Complaint   Patient presents with   • Hypertension     Takes hydrochlorothiazide 12.5 mg and metoprolol  mg   • Hyperlipidemia     Takes pravastatin 40 mg daily   • Hypothyroidism     Takes [place] Thyroid 90 mg daily   • Blood Sugar Problem     Currently on no blood sugar lowering therapy   .    58-year-old male.  6-month checkup/lab update visit.  No complaints or problems on today's visit.      Hypertension   This is a chronic problem. The current episode started more than 1 year ago. The problem is controlled. Current antihypertension treatment includes diuretics and beta blockers. The current treatment provides moderate improvement. There are no compliance problems.    Hyperlipidemia   This is a chronic problem. The current episode started more than 1 year ago. The problem is controlled. Recent lipid tests were reviewed and are normal. Exacerbating diseases include diabetes and hypothyroidism. Current antihyperlipidemic treatment includes statins. The current treatment provides moderate improvement of lipids. There are no compliance problems.    Hypothyroidism   This is a chronic problem. The current episode started more than 1 year ago. The problem has been unchanged. Treatments tried: [place] Thyroid 90 mg daily as per history. The treatment provided mild relief.   Blood Sugar Problem   This is a chronic problem. The current episode started more than 1 year ago. The problem has been waxing and waning. He has tried nothing for the symptoms.        The following portions of the patient's history were reviewed and updated as appropriate: allergies, current medications, past medical history and problem list.    Review of Systems   Constitutional: Negative.    HENT: Negative.    Eyes: Negative.    Respiratory: Negative.    Cardiovascular: Negative.    Genitourinary: Negative.    Musculoskeletal: Negative.    Skin: Negative.    Neurological:  Negative.    Psychiatric/Behavioral: Negative.        Objective   Physical Exam   Constitutional: He is oriented to person, place, and time. He appears well-developed and well-nourished. No distress.   HENT:   Head: Normocephalic and atraumatic.   Eyes: Conjunctivae and EOM are normal. Pupils are equal, round, and reactive to light.   Neck: Normal range of motion. Neck supple. No thyromegaly present.   Neck exam negative.  Carotid auscultation normal-no bruits heard.   Cardiovascular: Normal rate, regular rhythm, normal heart sounds and intact distal pulses. Exam reveals no gallop and no friction rub.   No murmur heard.  Pulmonary/Chest: Effort normal and breath sounds normal. No respiratory distress. He has no wheezes. He has no rales. He exhibits no tenderness.   Neurological: He is alert and oriented to person, place, and time.   Psychiatric: He has a normal mood and affect. His behavior is normal. Judgment and thought content normal.   Nursing note and vitals reviewed.      Assessment/Plan   Eros was seen today for hypertension, hyperlipidemia, hypothyroidism and blood sugar problem.    Diagnoses and all orders for this visit:    Benign essential hypertension  -     Comprehensive Metabolic Panel  -     TSH+Free T4    Mixed hyperlipidemia  -     Comprehensive Metabolic Panel  -     Lipid Panel    Acquired hypothyroidism  -     Comprehensive Metabolic Panel  -     TSH+Free T4    Hyperglycemia  -     TSH+Free T4  -     Hemoglobin A1c      Plan: Labs as above.Today's exam unremarkable for any new problems or events.  Continue all current treatment as prescribed.  A follow-up checkup/lab update visit is advised for 6 months assuming today's labs are all acceptable.      Referral to Dr. Jesus--gastroenterology for follow-up colonoscopy will be entered per his request

## 2019-07-02 RX ORDER — PRAVASTATIN SODIUM 40 MG
TABLET ORAL
Qty: 90 TABLET | Refills: 2 | Status: SHIPPED | OUTPATIENT
Start: 2019-07-02 | End: 2020-07-27

## 2019-09-26 DIAGNOSIS — I10 BENIGN ESSENTIAL HYPERTENSION: ICD-10-CM

## 2019-09-26 RX ORDER — HYDROCHLOROTHIAZIDE 12.5 MG/1
CAPSULE, GELATIN COATED ORAL
Qty: 30 CAPSULE | Refills: 3 | Status: SHIPPED | OUTPATIENT
Start: 2019-09-26 | End: 2019-12-04 | Stop reason: DRUGHIGH

## 2019-11-01 DIAGNOSIS — I10 BENIGN ESSENTIAL HYPERTENSION: ICD-10-CM

## 2019-11-01 RX ORDER — METOPROLOL SUCCINATE 100 MG/1
TABLET, EXTENDED RELEASE ORAL
Qty: 30 TABLET | Refills: 1 | Status: SHIPPED | OUTPATIENT
Start: 2019-11-01 | End: 2019-12-30

## 2019-12-03 ENCOUNTER — OFFICE VISIT (OUTPATIENT)
Dept: INTERNAL MEDICINE | Age: 59
End: 2019-12-03

## 2019-12-03 VITALS
OXYGEN SATURATION: 98 % | HEART RATE: 58 BPM | DIASTOLIC BLOOD PRESSURE: 90 MMHG | SYSTOLIC BLOOD PRESSURE: 144 MMHG | TEMPERATURE: 97.9 F | HEIGHT: 75 IN | WEIGHT: 235.6 LBS | BODY MASS INDEX: 29.29 KG/M2 | RESPIRATION RATE: 13 BRPM

## 2019-12-03 DIAGNOSIS — R73.9 HYPERGLYCEMIA: ICD-10-CM

## 2019-12-03 DIAGNOSIS — Z00.00 HEALTHCARE MAINTENANCE: ICD-10-CM

## 2019-12-03 DIAGNOSIS — E78.2 MIXED HYPERLIPIDEMIA: ICD-10-CM

## 2019-12-03 DIAGNOSIS — Z23 NEED FOR INFLUENZA VACCINATION: Primary | ICD-10-CM

## 2019-12-03 DIAGNOSIS — E03.9 ACQUIRED HYPOTHYROIDISM: ICD-10-CM

## 2019-12-03 DIAGNOSIS — I10 BENIGN ESSENTIAL HYPERTENSION: ICD-10-CM

## 2019-12-03 PROCEDURE — 90471 IMMUNIZATION ADMIN: CPT | Performed by: INTERNAL MEDICINE

## 2019-12-03 PROCEDURE — 90674 CCIIV4 VAC NO PRSV 0.5 ML IM: CPT | Performed by: INTERNAL MEDICINE

## 2019-12-03 PROCEDURE — 99214 OFFICE O/P EST MOD 30 MIN: CPT | Performed by: INTERNAL MEDICINE

## 2019-12-03 NOTE — PROGRESS NOTES
Eros Ellis is a 58 y.o. male who presents with   Chief Complaint   Patient presents with   • Hypertension     Hydrochlorothiazide 12.5 mg daily; metoprolol succinate  mg-one half twice daily.  Patient reports that his blood pressures over the last year and a half at home have consistently been at or above 130/80   • Hyperlipidemia     Pravastatin 40 mg   • Blood Sugar Problem     No prescription medication   • Requests flu shot   • Hypothyroidism     [place] Thyroid 90 mg daily.  Patient had thyroid profile from an outside source prior to today's visit.  His T4, TSH and T3 levels all were normal.   .    58-year-old male presents for his routine 6-month checkup and lab update visit.  It is noted that his blood pressure is elevated at 144/90 and by history his blood pressure at home has been consistently above 130/80 for the past year and a half he says.      Hypertension   This is a chronic problem. The current episode started more than 1 year ago. The problem has been waxing and waning since onset. The problem is controlled. Past treatments include diuretics, calcium channel blockers and angiotensin blockers (By history he is intolerant of amlodipine and valsartan.). Current antihypertension treatment includes diuretics and beta blockers. The current treatment provides mild improvement. There are no compliance problems.    Hyperlipidemia   This is a chronic problem. The current episode started more than 1 year ago. The problem is controlled. Recent lipid tests were reviewed and are normal. Exacerbating diseases include diabetes and hypothyroidism. Current antihyperlipidemic treatment includes statins. The current treatment provides moderate improvement of lipids. There are no compliance problems.    Blood Sugar Problem   This is a chronic problem. The current episode started more than 1 year ago. The problem has been waxing and waning. He has tried nothing for the symptoms.   Hypothyroidism   This is a  "chronic problem. The current episode started more than 1 year ago. The problem has been unchanged. Treatments tried: As above.  Tolerated.  Efficacious.        /90   Pulse 58   Temp 97.9 °F (36.6 °C)   Resp 13   Ht 190.5 cm (75\")   Wt 107 kg (235 lb 9.6 oz)   SpO2 98%   BMI 29.45 kg/m²     The following portions of the patient's history were reviewed and updated as appropriate: allergies, current medications, past medical history and problem list.    Review of Systems   Constitutional: Negative.    HENT: Negative.    Eyes: Negative.    Respiratory: Negative.    Cardiovascular: Negative.    Genitourinary: Negative.    Musculoskeletal: Negative.    Skin: Negative.    Neurological: Negative.    Psychiatric/Behavioral: Negative.        Objective   Physical Exam   Constitutional: He is oriented to person, place, and time. He appears well-developed and well-nourished. No distress.   HENT:   Head: Normocephalic and atraumatic.   Eyes: Conjunctivae and EOM are normal. Pupils are equal, round, and reactive to light.   Neck: Normal range of motion. Neck supple. No thyromegaly present.   Neck exam negative.  Carotid auscultation normal-no bruits heard.   Cardiovascular: Normal rate, regular rhythm, normal heart sounds and intact distal pulses. Exam reveals no gallop and no friction rub.   No murmur heard.  Pulmonary/Chest: Effort normal and breath sounds normal. No respiratory distress. He has no wheezes. He has no rales. He exhibits no tenderness.   Neurological: He is alert and oriented to person, place, and time.   Psychiatric: He has a normal mood and affect. His behavior is normal. Judgment and thought content normal.   Nursing note and vitals reviewed.      Assessment/Plan   Eros was seen today for hypertension, hyperlipidemia, blood sugar problem, requests flu shot and hypothyroidism.    Diagnoses and all orders for this visit:    Need for influenza vaccination  -     Flucelvax Quad=>4Years " ([id number])    Benign essential hypertension  -     Comprehensive Metabolic Panel    Mixed hyperlipidemia  -     Comprehensive Metabolic Panel  -     Lipid Panel    Hyperglycemia  -     Comprehensive Metabolic Panel  -     Hemoglobin A1c    Healthcare maintenance  -     Hepatitis C Antibody    Acquired hypothyroidism        Plan: The patient presents with labs from an outside source including testosterone level, T4, TSH, T3 level and PSA and all of these are normal and will not be repeated on today's visit.  A copy of his labs will be scanned into our system.    We will do labs as above.  Tentative plans are for a general 6-month checkup/lab update visit sometime in June.    Annual physical advised-declined.    Flu shot given per request.    Hypertension: I have suggested that he increase his hydrochlorothiazide from 12.5 mg daily to 2 tablets (25 mg) every morning and continue metoprolol XL succinate 100 mg one half twice daily as he is doing.  We will recheck his blood pressure in 4 weeks to assess efficacy.  He will also monitor his blood pressure at home twice daily and bring readings in when he comes next time.

## 2019-12-04 LAB
ALBUMIN SERPL-MCNC: 4.5 G/DL (ref 3.5–5.2)
ALBUMIN/GLOB SERPL: 2 G/DL
ALP SERPL-CCNC: 75 U/L (ref 39–117)
ALT SERPL-CCNC: 35 U/L (ref 1–41)
AST SERPL-CCNC: 23 U/L (ref 1–40)
BILIRUB SERPL-MCNC: 0.7 MG/DL (ref 0.2–1.2)
BUN SERPL-MCNC: 18 MG/DL (ref 6–20)
BUN/CREAT SERPL: 12.6 (ref 7–25)
CALCIUM SERPL-MCNC: 9.4 MG/DL (ref 8.6–10.5)
CHLORIDE SERPL-SCNC: 100 MMOL/L (ref 98–107)
CHOLEST SERPL-MCNC: 167 MG/DL (ref 0–200)
CO2 SERPL-SCNC: 30.8 MMOL/L (ref 22–29)
CREAT SERPL-MCNC: 1.43 MG/DL (ref 0.76–1.27)
GLOBULIN SER CALC-MCNC: 2.3 GM/DL
GLUCOSE SERPL-MCNC: 103 MG/DL (ref 65–99)
HBA1C MFR BLD: 5.7 % (ref 4.8–5.6)
HCV AB S/CO SERPL IA: <0.1 S/CO RATIO (ref 0–0.9)
HDLC SERPL-MCNC: 42 MG/DL (ref 40–60)
LDLC SERPL CALC-MCNC: 99 MG/DL (ref 0–100)
POTASSIUM SERPL-SCNC: 4.2 MMOL/L (ref 3.5–5.2)
PROT SERPL-MCNC: 6.8 G/DL (ref 6–8.5)
SODIUM SERPL-SCNC: 142 MMOL/L (ref 136–145)
TRIGL SERPL-MCNC: 130 MG/DL (ref 0–150)
VLDLC SERPL CALC-MCNC: 26 MG/DL

## 2019-12-04 RX ORDER — HYDROCHLOROTHIAZIDE 25 MG/1
25 TABLET ORAL DAILY
Qty: 30 TABLET | Refills: 1 | Status: SHIPPED | OUTPATIENT
Start: 2019-12-04 | End: 2020-02-10

## 2019-12-04 NOTE — PROGRESS NOTES
With minor variations the Comprehensive Metabolic Panel,Lipid Panel, hemoglobin A1c and hepatitis C screening test are within stable and acceptable ranges . Continue all meds as they are. A followup visit with fasting lab updates is advised for 6 months.

## 2019-12-30 ENCOUNTER — OFFICE VISIT (OUTPATIENT)
Dept: INTERNAL MEDICINE | Age: 59
End: 2019-12-30

## 2019-12-30 VITALS
RESPIRATION RATE: 13 BRPM | WEIGHT: 240.8 LBS | TEMPERATURE: 98 F | DIASTOLIC BLOOD PRESSURE: 90 MMHG | OXYGEN SATURATION: 98 % | HEIGHT: 75 IN | HEART RATE: 66 BPM | BODY MASS INDEX: 29.94 KG/M2 | SYSTOLIC BLOOD PRESSURE: 148 MMHG

## 2019-12-30 DIAGNOSIS — I10 BENIGN ESSENTIAL HYPERTENSION: Primary | ICD-10-CM

## 2019-12-30 PROCEDURE — 99214 OFFICE O/P EST MOD 30 MIN: CPT | Performed by: INTERNAL MEDICINE

## 2019-12-30 RX ORDER — VALSARTAN 160 MG/1
TABLET ORAL
Qty: 90 TABLET | Refills: 3 | Status: SHIPPED | OUTPATIENT
Start: 2019-12-30 | End: 2020-02-28 | Stop reason: DRUGHIGH

## 2019-12-30 NOTE — PROGRESS NOTES
"  Eros Ellis is a 59 y.o. male who presents with   Chief Complaint   Patient presents with   • Hypertension     Metoprolol XL succinate 100 mg; hydrochlorothiazide 25 mg.  Blood pressures at home consistently around 135/84   .    59-year-old male.  Blood pressure reevaluation after having increased hydrochlorothiazide from 12.5 mg daily to 25 mg daily about a month ago.  Blood pressures at home remaining a bit on the upper normal side by his readings.  Also his blood pressure did better with the valsartan hydrochlorothiazide combination than with the metoprolol XL succinate/hydrochlorothiazide combination.  The reason for changing was a suspected change in the GFR with the valsartan hydrochlorothiazide combination.  Review of his lab studies however from 2017 to present shows a range of GFR in the 50-60 range consistently.  He indicates that he would like to go back to the valsartan hydrochlorothiazide combination.    Hypertension   This is a chronic problem. The current episode started more than 1 year ago. The problem has been waxing and waning since onset. The problem is controlled. Past treatments include angiotensin blockers and diuretics. Current antihypertension treatment includes beta blockers and diuretics. The current treatment provides no improvement. There are no compliance problems.         /90   Pulse 66   Temp 98 °F (36.7 °C)   Resp 13   Ht 190.5 cm (75\")   Wt 109 kg (240 lb 12.8 oz)   SpO2 98%   BMI 30.10 kg/m²     The following portions of the patient's history were reviewed and updated as appropriate: allergies, current medications, past medical history and problem list.    Review of Systems   Constitutional: Negative.    HENT: Negative.    Eyes: Negative.    Respiratory: Negative.    Cardiovascular: Negative.    Genitourinary: Negative.    Musculoskeletal: Negative.    Skin: Negative.    Neurological: Negative.    Psychiatric/Behavioral: Negative.        Objective   Physical " Exam   Constitutional: He is oriented to person, place, and time. He appears well-developed and well-nourished. No distress.   HENT:   Head: Normocephalic and atraumatic.   Eyes: Pupils are equal, round, and reactive to light. Conjunctivae and EOM are normal.   Neck: Normal range of motion. Neck supple. No thyromegaly present.   Neck exam negative.  Carotid auscultation normal-no bruits heard.   Cardiovascular: Normal rate, regular rhythm, normal heart sounds and intact distal pulses. Exam reveals no gallop and no friction rub.   No murmur heard.  Pulmonary/Chest: Effort normal and breath sounds normal. No respiratory distress. He has no wheezes. He has no rales. He exhibits no tenderness.   Neurological: He is alert and oriented to person, place, and time.   Psychiatric: He has a normal mood and affect. His behavior is normal. Judgment and thought content normal.   Nursing note and vitals reviewed.      Assessment/Plan   Eros was seen today for hypertension.    Diagnoses and all orders for this visit:    Benign essential hypertension    Other orders  -     valsartan (DIOVAN) 160 MG tablet; Take 1 every morning for elevated blood pressure      Plan: With the patient's full understanding of the potential renal effects of diminished GFR with the combination of valsartan/hydrochlorothiazide and with the patient's full desire to return to that combination medication, we will discontinue metoprolol XL succinate 100 mg.    He will continue hydrochlorothiazide 25 mg.  We will resume valsartan at a dose of 160 mg daily.  He will monitor his blood pressures at home aiming for a goal of 130/80 or less consistently.  If there and consistently so we will see him in June for a checkup/lab update visit then.

## 2020-02-10 RX ORDER — HYDROCHLOROTHIAZIDE 25 MG/1
TABLET ORAL
Qty: 30 TABLET | Refills: 5 | Status: SHIPPED | OUTPATIENT
Start: 2020-02-10 | End: 2020-08-09

## 2020-02-19 ENCOUNTER — OFFICE VISIT (OUTPATIENT)
Dept: INTERNAL MEDICINE | Age: 60
End: 2020-02-19

## 2020-02-19 VITALS
SYSTOLIC BLOOD PRESSURE: 150 MMHG | OXYGEN SATURATION: 98 % | WEIGHT: 238.4 LBS | BODY MASS INDEX: 29.64 KG/M2 | TEMPERATURE: 97.4 F | DIASTOLIC BLOOD PRESSURE: 90 MMHG | HEIGHT: 75 IN | HEART RATE: 80 BPM | RESPIRATION RATE: 13 BRPM

## 2020-02-19 DIAGNOSIS — I10 BENIGN ESSENTIAL HYPERTENSION: Primary | ICD-10-CM

## 2020-02-19 PROCEDURE — 99214 OFFICE O/P EST MOD 30 MIN: CPT | Performed by: INTERNAL MEDICINE

## 2020-02-19 NOTE — PROGRESS NOTES
"  Eros Ellis is a 59 y.o. male who presents with   Chief Complaint   Patient presents with   • Hypertension     Blood pressures at home consistently 140-150/80-85 on current treatment.  In the past he did better when the valsartan dosage was 320 mg rather than 160 mg along with his hydrochlorothiazide 25 mg   .    59-year-old male.  History as above regarding his blood pressure.  Review of his blood pressures show no significant changes since December.    Hypertension   This is a chronic problem. The current episode started more than 1 year ago. The problem has been gradually worsening since onset. The problem is controlled. Current antihypertension treatment includes diuretics and angiotensin blockers. The current treatment provides no improvement. There are no compliance problems.         /90   Pulse 80   Temp 97.4 °F (36.3 °C)   Resp 13   Ht 190.5 cm (75\")   Wt 108 kg (238 lb 6.4 oz)   SpO2 98%   BMI 29.80 kg/m²     The following portions of the patient's history were reviewed and updated as appropriate: allergies, current medications, past medical history and problem list.    Review of Systems   Constitutional: Negative.    HENT: Negative.    Eyes: Negative.    Respiratory: Negative.    Cardiovascular: Negative.    Genitourinary: Negative.    Musculoskeletal: Negative.    Skin: Negative.    Neurological: Negative.    Psychiatric/Behavioral: Negative.        Objective   Physical Exam   Constitutional: He is oriented to person, place, and time. He appears well-developed and well-nourished. No distress.   HENT:   Head: Normocephalic and atraumatic.   Eyes: Pupils are equal, round, and reactive to light. Conjunctivae and EOM are normal.   Neck: Normal range of motion. Neck supple. No thyromegaly present.   Neck exam negative.  Carotid auscultation normal-no bruits heard.   Cardiovascular: Normal rate, regular rhythm, normal heart sounds and intact distal pulses. Exam reveals no gallop and no " friction rub.   No murmur heard.  Pulmonary/Chest: Effort normal and breath sounds normal. No respiratory distress. He has no wheezes. He has no rales. He exhibits no tenderness.   Neurological: He is alert and oriented to person, place, and time.   Psychiatric: He has a normal mood and affect. His behavior is normal. Judgment and thought content normal.   Nursing note and vitals reviewed.      Assessment/Plan   Eros was seen today for hypertension.    Diagnoses and all orders for this visit:    Benign essential hypertension        Plan: Blood pressure not being controlled on his current hydrochlorothiazide 25 mg with valsartan 160 mg daily.    Will have him increase his valsartan 160 mg daily to 2 every evening (320 mg) at 6 PM.    At 6 AM he will take his hydrochlorothiazide 25 mg.    He will give it a couple of weeks to see what his blood pressure does.  If it does not get consistently at or below 130/80 he will call us with the numbers and then further advice and further prescriptions will be pending that information.    He has a follow-up 6-month checkup/lab update visit scheduled for June.  Assuming his blood pressures stabilize at or below 130/80 we will see him in June and he will continue his treatment as advised above

## 2020-02-28 ENCOUNTER — PATIENT MESSAGE (OUTPATIENT)
Dept: INTERNAL MEDICINE | Age: 60
End: 2020-02-28

## 2020-02-28 RX ORDER — VALSARTAN 320 MG/1
320 TABLET ORAL DAILY
Qty: 90 TABLET | Refills: 1 | Status: SHIPPED | OUTPATIENT
Start: 2020-02-28 | End: 2020-08-26

## 2020-06-10 ENCOUNTER — OFFICE VISIT (OUTPATIENT)
Dept: INTERNAL MEDICINE | Age: 60
End: 2020-06-10

## 2020-06-10 VITALS
RESPIRATION RATE: 13 BRPM | HEART RATE: 93 BPM | BODY MASS INDEX: 29.49 KG/M2 | DIASTOLIC BLOOD PRESSURE: 88 MMHG | HEIGHT: 75 IN | TEMPERATURE: 97.2 F | OXYGEN SATURATION: 97 % | SYSTOLIC BLOOD PRESSURE: 138 MMHG | WEIGHT: 237.2 LBS

## 2020-06-10 DIAGNOSIS — E03.9 ACQUIRED HYPOTHYROIDISM: ICD-10-CM

## 2020-06-10 DIAGNOSIS — I10 BENIGN ESSENTIAL HYPERTENSION: Primary | ICD-10-CM

## 2020-06-10 DIAGNOSIS — E78.2 MIXED HYPERLIPIDEMIA: ICD-10-CM

## 2020-06-10 DIAGNOSIS — E55.9 VITAMIN D DEFICIENCY: ICD-10-CM

## 2020-06-10 DIAGNOSIS — N52.9 ERECTILE DYSFUNCTION, UNSPECIFIED ERECTILE DYSFUNCTION TYPE: ICD-10-CM

## 2020-06-10 DIAGNOSIS — R73.9 HYPERGLYCEMIA: ICD-10-CM

## 2020-06-10 DIAGNOSIS — R35.1 NOCTURIA: ICD-10-CM

## 2020-06-10 LAB
ALBUMIN SERPL-MCNC: 4.6 G/DL (ref 3.5–5.2)
ALBUMIN/GLOB SERPL: 1.8 G/DL
ALP SERPL-CCNC: 67 U/L (ref 39–117)
ALT SERPL-CCNC: 31 U/L (ref 1–41)
AST SERPL-CCNC: 20 U/L (ref 1–40)
BILIRUB SERPL-MCNC: 0.7 MG/DL (ref 0.2–1.2)
BUN SERPL-MCNC: 15 MG/DL (ref 6–20)
BUN/CREAT SERPL: 11.1 (ref 7–25)
CALCIUM SERPL-MCNC: 9.5 MG/DL (ref 8.6–10.5)
CHLORIDE SERPL-SCNC: 103 MMOL/L (ref 98–107)
CHOLEST SERPL-MCNC: 148 MG/DL (ref 0–200)
CO2 SERPL-SCNC: 26.8 MMOL/L (ref 22–29)
CREAT SERPL-MCNC: 1.35 MG/DL (ref 0.76–1.27)
GLOBULIN SER CALC-MCNC: 2.6 GM/DL
GLUCOSE SERPL-MCNC: 108 MG/DL (ref 65–99)
HBA1C MFR BLD: 5.6 % (ref 4.8–5.6)
HDLC SERPL-MCNC: 40 MG/DL (ref 40–60)
LDLC SERPL CALC-MCNC: 90 MG/DL (ref 0–100)
POTASSIUM SERPL-SCNC: 3.8 MMOL/L (ref 3.5–5.2)
PROT SERPL-MCNC: 7.2 G/DL (ref 6–8.5)
PSA SERPL-MCNC: 1.39 NG/ML (ref 0–4)
SODIUM SERPL-SCNC: 139 MMOL/L (ref 136–145)
T4 FREE SERPL-MCNC: 0.92 NG/DL (ref 0.93–1.7)
TRIGL SERPL-MCNC: 88 MG/DL (ref 0–150)
TSH SERPL DL<=0.005 MIU/L-ACNC: 0.67 UIU/ML (ref 0.27–4.2)
VLDLC SERPL CALC-MCNC: 17.6 MG/DL

## 2020-06-10 PROCEDURE — 99214 OFFICE O/P EST MOD 30 MIN: CPT | Performed by: INTERNAL MEDICINE

## 2020-06-10 NOTE — PROGRESS NOTES
Eros Ellis is a 59 y.o. male who presents with   Chief Complaint   Patient presents with   • Hypertension     Hydrochlorothiazide 25 mg; valsartan 320 mg   • Hyperlipidemia     Pravastatin 40 mg   • Hypothyroidism     [place] Thyroid 90 mg   • Blood Sugar Problem     Mild elevation in the past but no prescription treatment   • Nocturia     Nighttime urinary frequency dependent upon fluid consumption prior to bedtime   • Erectile Dysfunction     Sildenafil 20 mg as directed; testosterone cream 20%   • Vitamin D Deficiency     OTC vitamin D3-5000 units daily   .    59-year-old male.  6-month checkup/lab update visit for the issues as outlined above.  No specific complaints or problems on today's visit.    Hypertension   This is a chronic problem. The current episode started more than 1 year ago. The problem has been waxing and waning since onset. The problem is controlled. Current antihypertension treatment includes diuretics and angiotensin blockers. The current treatment provides moderate improvement. Compliance problems include diet.    Hyperlipidemia   This is a chronic problem. The current episode started more than 1 year ago. The problem is controlled. Exacerbating diseases include diabetes and hypothyroidism. Current antihyperlipidemic treatment includes statins. The current treatment provides moderate improvement of lipids. Compliance problems include adherence to diet.    Hypothyroidism   This is a chronic problem. The current episode started more than 1 year ago. The problem has been unchanged. Treatments tried: As above.  Tolerated.  Efficacious.   Blood Sugar Problem   This is a chronic problem. The current episode started more than 1 year ago. The problem has been waxing and waning. He has tried nothing for the symptoms.   Erectile Dysfunction   This is a chronic problem. The current episode started more than 1 year ago. The nature of his difficulty is achieving erection and maintaining erection.  "Treatments tried: Sildenafil/testosterone cream as noted above. The treatment provided moderate relief.        /88   Pulse 93   Temp 97.2 °F (36.2 °C)   Resp 13   Ht 190.5 cm (75\")   Wt 108 kg (237 lb 3.2 oz)   SpO2 97%   BMI 29.65 kg/m²     The following portions of the patient's history were reviewed and updated as appropriate: allergies, current medications, past medical history and problem list.    Review of Systems   Constitutional: Negative.    HENT: Negative.    Eyes: Negative.    Respiratory: Negative.    Cardiovascular: Negative.    Genitourinary: Negative.    Musculoskeletal: Negative.    Skin: Negative.    Neurological: Negative.    Psychiatric/Behavioral: Negative.        Objective   Physical Exam   Constitutional: He is oriented to person, place, and time. He appears well-developed and well-nourished. No distress.   HENT:   Head: Normocephalic and atraumatic.   Eyes: Pupils are equal, round, and reactive to light. Conjunctivae and EOM are normal.   Neck: Normal range of motion. Neck supple. No thyromegaly present.   Neck exam negative.  Carotid auscultation normal-no bruits heard.   Cardiovascular: Normal rate, regular rhythm, normal heart sounds and intact distal pulses. Exam reveals no gallop and no friction rub.   No murmur heard.  Pulmonary/Chest: Effort normal and breath sounds normal. No respiratory distress. He has no wheezes. He has no rales. He exhibits no tenderness.   Neurological: He is alert and oriented to person, place, and time.   Psychiatric: He has a normal mood and affect. His behavior is normal. Judgment and thought content normal.   Nursing note and vitals reviewed.      Assessment/Plan   Eros was seen today for hypertension, hyperlipidemia, hypothyroidism, blood sugar problem, nocturia, erectile dysfunction and vitamin d deficiency.    Diagnoses and all orders for this visit:    Benign essential hypertension  -     Comprehensive Metabolic Panel  -     TSH+Free " T4    Mixed hyperlipidemia  -     Comprehensive Metabolic Panel  -     Lipid Panel  -     TSH+Free T4    Hyperglycemia  -     Comprehensive Metabolic Panel  -     Hemoglobin A1c  -     TSH+Free T4    Acquired hypothyroidism  -     TSH+Free T4    Nocturia  -     PSA DIAGNOSTIC    Vitamin D deficiency  -     Vitamin D 25 Hydroxy    Erectile dysfunction, unspecified erectile dysfunction type  -     Testosterone        Plan: Labs as above.Today's exam unremarkable for any new problems or events.  Continue all current treatment as prescribed.  A follow-up checkup/lab update visit is advised for 6 months assuming today's labs are all acceptable.

## 2020-07-27 DIAGNOSIS — E78.2 MIXED HYPERLIPIDEMIA: Primary | ICD-10-CM

## 2020-07-27 RX ORDER — PRAVASTATIN SODIUM 40 MG
TABLET ORAL
Qty: 90 TABLET | Refills: 1 | Status: SHIPPED | OUTPATIENT
Start: 2020-07-27 | End: 2021-06-15

## 2020-08-09 DIAGNOSIS — I10 BENIGN ESSENTIAL HYPERTENSION: Primary | ICD-10-CM

## 2020-08-09 RX ORDER — HYDROCHLOROTHIAZIDE 25 MG/1
TABLET ORAL
Qty: 30 TABLET | Refills: 4 | Status: SHIPPED | OUTPATIENT
Start: 2020-08-09 | End: 2020-12-28 | Stop reason: SDUPTHER

## 2020-08-26 DIAGNOSIS — I10 ESSENTIAL HYPERTENSION: Primary | ICD-10-CM

## 2020-08-26 RX ORDER — VALSARTAN 320 MG/1
TABLET ORAL
Qty: 90 TABLET | Refills: 2 | Status: SHIPPED | OUTPATIENT
Start: 2020-08-26 | End: 2021-05-26 | Stop reason: SDUPTHER

## 2020-12-15 ENCOUNTER — OFFICE VISIT (OUTPATIENT)
Dept: INTERNAL MEDICINE | Age: 60
End: 2020-12-15

## 2020-12-15 VITALS
RESPIRATION RATE: 13 BRPM | HEART RATE: 83 BPM | BODY MASS INDEX: 30.88 KG/M2 | HEIGHT: 74 IN | DIASTOLIC BLOOD PRESSURE: 88 MMHG | OXYGEN SATURATION: 98 % | TEMPERATURE: 96.9 F | WEIGHT: 240.6 LBS | SYSTOLIC BLOOD PRESSURE: 136 MMHG

## 2020-12-15 DIAGNOSIS — E03.9 ACQUIRED HYPOTHYROIDISM: ICD-10-CM

## 2020-12-15 DIAGNOSIS — R73.9 HYPERGLYCEMIA: ICD-10-CM

## 2020-12-15 DIAGNOSIS — E78.2 MIXED HYPERLIPIDEMIA: ICD-10-CM

## 2020-12-15 DIAGNOSIS — I10 BENIGN ESSENTIAL HYPERTENSION: Primary | ICD-10-CM

## 2020-12-15 LAB
ALBUMIN SERPL-MCNC: 4.4 G/DL (ref 3.5–5.2)
ALBUMIN/GLOB SERPL: 1.8 G/DL
ALP SERPL-CCNC: 81 U/L (ref 39–117)
ALT SERPL-CCNC: 31 U/L (ref 1–41)
AST SERPL-CCNC: 27 U/L (ref 1–40)
BILIRUB SERPL-MCNC: 0.4 MG/DL (ref 0–1.2)
BUN SERPL-MCNC: 15 MG/DL (ref 6–20)
BUN/CREAT SERPL: 12.5 (ref 7–25)
CALCIUM SERPL-MCNC: 9.3 MG/DL (ref 8.6–10.5)
CHLORIDE SERPL-SCNC: 102 MMOL/L (ref 98–107)
CHOLEST SERPL-MCNC: 161 MG/DL (ref 0–200)
CO2 SERPL-SCNC: 31.6 MMOL/L (ref 22–29)
CREAT SERPL-MCNC: 1.2 MG/DL (ref 0.76–1.27)
GLOBULIN SER CALC-MCNC: 2.5 GM/DL
GLUCOSE SERPL-MCNC: 110 MG/DL (ref 65–99)
HBA1C MFR BLD: 5.8 % (ref 4.8–5.6)
HDLC SERPL-MCNC: 43 MG/DL (ref 40–60)
LDLC SERPL CALC-MCNC: 102 MG/DL (ref 0–100)
POTASSIUM SERPL-SCNC: 4 MMOL/L (ref 3.5–5.2)
PROT SERPL-MCNC: 6.9 G/DL (ref 6–8.5)
SODIUM SERPL-SCNC: 140 MMOL/L (ref 136–145)
T4 FREE SERPL-MCNC: 0.86 NG/DL (ref 0.93–1.7)
TRIGL SERPL-MCNC: 87 MG/DL (ref 0–150)
TSH SERPL DL<=0.005 MIU/L-ACNC: 0.81 UIU/ML (ref 0.27–4.2)
VLDLC SERPL CALC-MCNC: 16 MG/DL (ref 5–40)

## 2020-12-15 PROCEDURE — 99214 OFFICE O/P EST MOD 30 MIN: CPT | Performed by: INTERNAL MEDICINE

## 2020-12-15 NOTE — PROGRESS NOTES
"Answers for HPI/ROS submitted by the patient on 12/13/2020   What is the primary reason for your visit?: Physical    Eros Ellis is a 60 y.o. male who presents with   Chief Complaint   Patient presents with   • Hypertension   • Hyperlipidemia   • Blood Sugar Problem   • Hypothyroidism   .    60-year-old male.  6-month checkup/lab update visit.  Basic problems as outlined above weight remaining elevated.  Estimated goal weight approximately 215 pounds.  Blood pressure trending mildly elevated since last December but patient has been reluctant to add additional medication for blood pressure.  Increased cardiovascular and cerebrovascular risks explained with sustained blood pressure elevation as noted.    Hypertension  This is a chronic problem. The current episode started more than 1 year ago. The problem has been waxing and waning since onset. The problem is controlled. Current antihypertension treatment includes diuretics and angiotensin blockers. The current treatment provides mild improvement. Compliance problems include diet and exercise.    Hyperlipidemia  This is a chronic problem. The current episode started more than 1 year ago. The problem is controlled. Exacerbating diseases include diabetes and hypothyroidism. Treatments tried: See medication list. The current treatment provides no improvement of lipids. Compliance problems include adherence to diet and adherence to exercise.    Blood Sugar Problem  This is a chronic problem. The current episode started more than 1 year ago. The problem has been waxing and waning. He has tried nothing for the symptoms.   Hypothyroidism  This is a chronic problem. The current episode started more than 1 year ago. The problem has been waxing and waning. Treatments tried: See medication list.        /88   Pulse 83   Temp 96.9 °F (36.1 °C) (Temporal)   Resp 13   Ht 188 cm (74\")   Wt 109 kg (240 lb 9.6 oz)   SpO2 98%   BMI 30.89 kg/m²     The following " portions of the patient's history were reviewed and updated as appropriate: allergies, current medications, past family history, past medical history, past social history, past surgical history and problem list.    Review of Systems   Constitutional: Negative.    HENT: Negative.    Eyes: Negative.    Respiratory: Negative.    Cardiovascular: Negative.    Genitourinary: Negative.    Musculoskeletal: Negative.    Skin: Negative.    Neurological: Negative.    Psychiatric/Behavioral: Negative.        Objective   Physical Exam  Vitals signs and nursing note reviewed.   Constitutional:       General: He is not in acute distress.     Appearance: He is well-developed. He is not diaphoretic.   HENT:      Head: Normocephalic and atraumatic.   Eyes:      Pupils: Pupils are equal, round, and reactive to light.   Neck:      Musculoskeletal: Normal range of motion and neck supple.      Thyroid: No thyromegaly.      Comments: Neck exam negative.  Carotid auscultation normal-no bruits heard.    Cardiovascular:      Rate and Rhythm: Normal rate and regular rhythm.      Heart sounds: Normal heart sounds. No murmur. No friction rub. No gallop.    Pulmonary:      Effort: Pulmonary effort is normal. No respiratory distress.      Breath sounds: Normal breath sounds. No wheezing or rales.   Chest:      Chest wall: No tenderness.   Skin:     General: Skin is warm and dry.      Coloration: Skin is not pale.      Findings: No erythema.   Psychiatric:         Behavior: Behavior normal.         Thought Content: Thought content normal.         Judgment: Judgment normal.         Assessment/Plan   Diagnoses and all orders for this visit:    1. Benign essential hypertension (Primary)  -     Comprehensive Metabolic Panel  -     TSH+Free T4    2. Mixed hyperlipidemia  -     Comprehensive Metabolic Panel  -     Lipid Panel    3. Hyperglycemia  -     Comprehensive Metabolic Panel  -     Hemoglobin A1c  -     TSH+Free T4    4. Acquired  hypothyroidism  -     Comprehensive Metabolic Panel  -     TSH+Free T4      Plan: Labs as above.  Continue current treatment pending lab results.    Patient declines additional medication for blood pressure.  Prefers to try weight loss instead.    6-month checkup advised assuming labs acceptable

## 2020-12-16 DIAGNOSIS — E03.9 ACQUIRED HYPOTHYROIDISM: Primary | ICD-10-CM

## 2020-12-16 RX ORDER — THYROID,PORK 90 MG
90 TABLET ORAL 2 TIMES DAILY
Qty: 180 TABLET | Refills: 1 | Status: SHIPPED | OUTPATIENT
Start: 2020-12-16 | End: 2021-07-22 | Stop reason: CLARIF

## 2020-12-28 DIAGNOSIS — I10 BENIGN ESSENTIAL HYPERTENSION: ICD-10-CM

## 2020-12-28 RX ORDER — HYDROCHLOROTHIAZIDE 25 MG/1
25 TABLET ORAL DAILY
Qty: 30 TABLET | Refills: 2 | Status: SHIPPED | OUTPATIENT
Start: 2020-12-28 | End: 2021-04-12 | Stop reason: SDUPTHER

## 2021-02-22 ENCOUNTER — OFFICE VISIT (OUTPATIENT)
Dept: INTERNAL MEDICINE | Facility: CLINIC | Age: 61
End: 2021-02-22

## 2021-02-22 VITALS
HEART RATE: 85 BPM | DIASTOLIC BLOOD PRESSURE: 82 MMHG | BODY MASS INDEX: 30.03 KG/M2 | SYSTOLIC BLOOD PRESSURE: 140 MMHG | HEIGHT: 74 IN | OXYGEN SATURATION: 98 % | WEIGHT: 234 LBS | TEMPERATURE: 97.8 F

## 2021-02-22 DIAGNOSIS — E03.9 ACQUIRED HYPOTHYROIDISM: ICD-10-CM

## 2021-02-22 DIAGNOSIS — I10 BENIGN ESSENTIAL HYPERTENSION: ICD-10-CM

## 2021-02-22 DIAGNOSIS — E78.2 MIXED HYPERLIPIDEMIA: ICD-10-CM

## 2021-02-22 DIAGNOSIS — Z76.89 ESTABLISHING CARE WITH NEW DOCTOR, ENCOUNTER FOR: Primary | ICD-10-CM

## 2021-02-22 LAB
T4 FREE SERPL-MCNC: 0.85 NG/DL (ref 0.93–1.7)
TSH SERPL DL<=0.005 MIU/L-ACNC: 0.69 UIU/ML (ref 0.27–4.2)

## 2021-02-22 PROCEDURE — 99214 OFFICE O/P EST MOD 30 MIN: CPT | Performed by: FAMILY MEDICINE

## 2021-02-22 NOTE — PROGRESS NOTES
"Chief Complaint  Establish Care and Hypertension    Subjective          Eros Ellis presents to [facility]  History of Present Illness    Prior PCP Shee    Hypertension - stable.  Patient taking medication as prescribed.  Denies chest pain, shortness of breath, headache, lower extremity edema.  Patient is taking valsartan 320mg daily, HCTZ 25mg daily.    HLD-stable.  Patient taking pravastatin 40mg daily as prescribed.  Trying to adhere to a low-fat diet.    Hypothyroidism - Stable.  Patient taking MP [place] Thyroid 90mg.  Patient denying any symptoms of hypothyroidism such as cold intolerance, constipation, mood swings.      Prediabetic - he is in the process of losing weight and down 10lbs.  Not yet due for labs.       Review of Systems   Constitutional: Negative for activity change, fatigue and fever.   Respiratory: Negative for cough and shortness of breath.    Cardiovascular: Negative for chest pain and palpitations.   Gastrointestinal: Negative for abdominal pain.     Objective   Vital Signs:   /82 (BP Location: Left arm, Patient Position: Sitting, Cuff Size: Adult)   Pulse 85   Temp 97.8 °F (36.6 °C) (Oral)   Ht 188 cm (74\")   Wt 106 kg (234 lb)   SpO2 98%   BMI 30.04 kg/m²     Physical Exam  Vitals signs and nursing note reviewed.   Constitutional:       General: He is not in acute distress.     Appearance: Normal appearance.   Cardiovascular:      Rate and Rhythm: Normal rate and regular rhythm.      Heart sounds: Normal heart sounds.   Pulmonary:      Effort: Pulmonary effort is normal.      Breath sounds: Normal breath sounds.   Neurological:      Mental Status: He is alert.        Result Review :   The following data was reviewed by: Siddharth Buckley MD on 02/22/2021:  Common labs    Common Labsle 6/10/20 6/10/20 6/10/20 6/10/20 12/15/20 12/15/20 12/15/20    0732 [id number] [id number] [id number] 0756   Glucose 108 (A)    110 (A)     BUN 15    15     Creatinine " 1.35 (A)    1.20     eGFR Non African Am 54 (A)    62     eGFR  Am 66    75     Sodium 139    140     Potassium 3.8    4.0     Chloride 103    102     Calcium 9.5    9.3     Total Protein 7.2    6.9     Albumin 4.60    4.40     Total Bilirubin 0.7    0.4     Alkaline Phosphatase 67    81     AST (SGOT) 20    27     ALT (SGPT) 31    31     Total Cholesterol   148    161   Triglycerides   88    87   HDL Cholesterol   40    43   LDL Cholesterol    90    102 (A)   Hemoglobin A1C  5.60    5.80 (A)    PSA    1.390      (A) Abnormal value       Comments are available for some flowsheets but are not being displayed.                     Assessment and Plan    Diagnoses and all orders for this visit:    1. Establishing care with new doctor, encounter for (Primary)  -     T4, Free  -     TSH    2. Benign essential hypertension    3. Mixed hyperlipidemia    4. Acquired hypothyroidism  -     T4, Free  -     TSH      We did brief counseling regarding the patient's BMI > 30.  We discussed healthy ways to eat, movement/exercise plan, and watching weight at home.  We will continue to visit this in the future.    Will check thyroid labs today and then make decision regarding medication.  Continue all medications as mentioned above.  All conditions appear stable.      Follow Up   Return in about 4 months (around 6/17/2021) for Recheck.  Patient was given instructions and counseling regarding his condition or for health maintenance advice. Please see specific information pulled into the AVS if appropriate.

## 2021-02-23 DIAGNOSIS — E03.9 ACQUIRED HYPOTHYROIDISM: Primary | ICD-10-CM

## 2021-03-30 ENCOUNTER — BULK ORDERING (OUTPATIENT)
Dept: CASE MANAGEMENT | Facility: OTHER | Age: 61
End: 2021-03-30

## 2021-03-30 DIAGNOSIS — Z23 IMMUNIZATION DUE: ICD-10-CM

## 2021-04-12 DIAGNOSIS — I10 BENIGN ESSENTIAL HYPERTENSION: ICD-10-CM

## 2021-04-12 RX ORDER — HYDROCHLOROTHIAZIDE 25 MG/1
25 TABLET ORAL DAILY
Qty: 90 TABLET | Refills: 1 | Status: SHIPPED | OUTPATIENT
Start: 2021-04-12 | End: 2021-06-15 | Stop reason: SDUPTHER

## 2021-05-26 DIAGNOSIS — I10 ESSENTIAL HYPERTENSION: ICD-10-CM

## 2021-05-26 RX ORDER — VALSARTAN 320 MG/1
320 TABLET ORAL DAILY
Qty: 90 TABLET | Refills: 0 | Status: SHIPPED | OUTPATIENT
Start: 2021-05-26 | End: 2021-06-15 | Stop reason: SDUPTHER

## 2021-05-26 NOTE — TELEPHONE ENCOUNTER
----- Message from Eros Ellis sent at 5/26/2021  8:00 AM EDT -----  Regarding: Prescription Question  Contact: [phone]  I called in a prescription refill to my [facility] pharmacy on Monday, May 24 for my Diovan prescription and it hasn't been refilled yet. Prescription was originally written by Dr. Milner in December 2020. Dr. Milner retired and I am now a patient of Dr. Buckley. I am afraid my refill is being held up because of this. I was told in February when I visited Dr. Buckley that the prescription should transfer. Can someone advise me on this? My pharmacy is [facility] at [phone].  I am down to three pills and need a refill immediately.    Thank you    Devonte Ellis

## 2021-06-14 NOTE — PROGRESS NOTES
"Chief Complaint  Hyperlipidemia (4 month recheck ), Hypertension, and Hypothyroidism    Subjective          Eros Ellis presents to [facility]  History of Present Illness     Hypertension - stable.  Patient taking medication as prescribed.  Denies chest pain, shortness of breath, headache, lower extremity edema.  Patient is taking valsartan 320mg daily, HCTZ 25mg daily.     HLD-stable.  Patient had not been taking pravastatin 40mg daily as prescribed for about 6 months due to weight loss and lifestyle changes.  If cholesterol improving, will hold on prescribing anymore.     Hypothyroidism - Stable.  Patient taking MP [place] Thyroid 90mg.  Patient denying any symptoms of hypothyroidism such as cold intolerance, constipation, mood swings.  Last TSH was showing 0.693 and T4 0.85. Seeing Endocrine next month.  Interested in converting to levothyroxine.  Will hold on TSH today since he will be seeing endo next month and converting.     Prediabetic - he is in the process of losing weight and down again this visit.   10lbs.  due for labs.    Objective   Vital Signs:   /78 (BP Location: Left arm, Patient Position: Sitting, Cuff Size: Adult)   Pulse 76   Temp 98.4 °F (36.9 °C) (Temporal)   Resp 16   Ht 188 cm (74\")   Wt 103 kg (227 lb 3.2 oz)   SpO2 99%   BMI 29.17 kg/m²     Physical Exam  Vitals and nursing note reviewed.   Constitutional:       General: He is not in acute distress.     Appearance: Normal appearance.   Cardiovascular:      Rate and Rhythm: Normal rate and regular rhythm.      Heart sounds: Normal heart sounds. No murmur heard.     Pulmonary:      Effort: Pulmonary effort is normal.      Breath sounds: Normal breath sounds.   Neurological:      Mental Status: He is alert.        Result Review :   The following data was reviewed by: Siddharth Buckley MD on 06/15/2021:  Common labs    Common Labsle 12/15/20 12/15/20 12/15/20    0756 0756 0756   Glucose 110 (A)   "   BUN 15     Creatinine 1.20     eGFR Non  Am 62     eGFR  Am 75     Sodium 140     Potassium 4.0     Chloride 102     Calcium 9.3     Total Protein 6.9     Albumin 4.40     Total Bilirubin 0.4     Alkaline Phosphatase 81     AST (SGOT) 27     ALT (SGPT) 31     Total Cholesterol   161   Triglycerides   87   HDL Cholesterol   43   LDL Cholesterol    102 (A)   Hemoglobin A1C  5.80 (A)    (A) Abnormal value       Comments are available for some flowsheets but are not being displayed.                     Assessment and Plan    Diagnoses and all orders for this visit:    1. Benign essential hypertension (Primary)  -     CBC (No Diff)  -     Comprehensive Metabolic Panel  -     hydroCHLOROthiazide (HYDRODIURIL) 25 MG tablet; Take 1 tablet by mouth Daily.  Dispense: 90 tablet; Refill: 3    2. Acquired hypothyroidism  -     Comprehensive Metabolic Panel    3. Mixed hyperlipidemia  -     CBC (No Diff)  -     Comprehensive Metabolic Panel  -     Lipid Panel    4. Prediabetes  -     Comprehensive Metabolic Panel  -     Hemoglobin A1c    5. Essential hypertension  -     valsartan (DIOVAN) 320 MG tablet; Take 1 tablet by mouth Daily.  Dispense: 90 tablet; Refill: 3      It seems that the patient has been doing incredibly well with his diet.  I fully expect his labs to be improved today from last time.  I will go ahead and stop that pravastatin and just see what the cholesterol panel looks like.  If we need to put him on something, we can always put him on a very small dose of it if needed.  However since he is still actively losing weight I may wait on that.  Continue all medications as above and routine labs today.  See back in 6 months.      Follow Up   Return in about 6 months (around 12/15/2021) for Recheck.  Patient was given instructions and counseling regarding his condition or for health maintenance advice. Please see specific information pulled into the AVS if appropriate.

## 2021-06-15 ENCOUNTER — OFFICE VISIT (OUTPATIENT)
Dept: INTERNAL MEDICINE | Facility: CLINIC | Age: 61
End: 2021-06-15

## 2021-06-15 VITALS
TEMPERATURE: 98.4 F | DIASTOLIC BLOOD PRESSURE: 78 MMHG | RESPIRATION RATE: 16 BRPM | BODY MASS INDEX: 29.16 KG/M2 | WEIGHT: 227.2 LBS | OXYGEN SATURATION: 99 % | SYSTOLIC BLOOD PRESSURE: 140 MMHG | HEART RATE: 76 BPM | HEIGHT: 74 IN

## 2021-06-15 DIAGNOSIS — I10 ESSENTIAL HYPERTENSION: ICD-10-CM

## 2021-06-15 DIAGNOSIS — I10 BENIGN ESSENTIAL HYPERTENSION: Primary | ICD-10-CM

## 2021-06-15 DIAGNOSIS — E03.9 ACQUIRED HYPOTHYROIDISM: ICD-10-CM

## 2021-06-15 DIAGNOSIS — E78.2 MIXED HYPERLIPIDEMIA: ICD-10-CM

## 2021-06-15 DIAGNOSIS — R73.03 PREDIABETES: ICD-10-CM

## 2021-06-15 LAB
ALBUMIN SERPL-MCNC: 4.7 G/DL (ref 3.5–5.2)
ALBUMIN/GLOB SERPL: 2.2 G/DL
ALP SERPL-CCNC: 85 U/L (ref 39–117)
ALT SERPL-CCNC: 23 U/L (ref 1–41)
AST SERPL-CCNC: 20 U/L (ref 1–40)
BILIRUB SERPL-MCNC: 0.7 MG/DL (ref 0–1.2)
BUN SERPL-MCNC: 17 MG/DL (ref 6–20)
BUN/CREAT SERPL: 12.8 (ref 7–25)
CALCIUM SERPL-MCNC: 9.8 MG/DL (ref 8.6–10.5)
CHLORIDE SERPL-SCNC: 104 MMOL/L (ref 98–107)
CHOLEST SERPL-MCNC: 183 MG/DL (ref 0–200)
CO2 SERPL-SCNC: 29.7 MMOL/L (ref 22–29)
CREAT SERPL-MCNC: 1.33 MG/DL (ref 0.76–1.27)
ERYTHROCYTE [DISTWIDTH] IN BLOOD BY AUTOMATED COUNT: 13.7 % (ref 12.3–15.4)
GLOBULIN SER CALC-MCNC: 2.1 GM/DL
GLUCOSE SERPL-MCNC: 108 MG/DL (ref 65–99)
HBA1C MFR BLD: 5.7 % (ref 4.8–5.6)
HCT VFR BLD AUTO: 52.3 % (ref 37.5–51)
HDLC SERPL-MCNC: 46 MG/DL (ref 40–60)
HGB BLD-MCNC: 17.6 G/DL (ref 13–17.7)
LDLC SERPL CALC-MCNC: 117 MG/DL (ref 0–100)
MCH RBC QN AUTO: 29.1 PG (ref 26.6–33)
MCHC RBC AUTO-ENTMCNC: 33.7 G/DL (ref 31.5–35.7)
MCV RBC AUTO: 86.6 FL (ref 79–97)
PLATELET # BLD AUTO: 161 10*3/MM3 (ref 140–450)
POTASSIUM SERPL-SCNC: 4 MMOL/L (ref 3.5–5.2)
PROT SERPL-MCNC: 6.8 G/DL (ref 6–8.5)
RBC # BLD AUTO: 6.04 10*6/MM3 (ref 4.14–5.8)
SODIUM SERPL-SCNC: 143 MMOL/L (ref 136–145)
TRIGL SERPL-MCNC: 109 MG/DL (ref 0–150)
VLDLC SERPL CALC-MCNC: 20 MG/DL (ref 5–40)
WBC # BLD AUTO: 5.38 10*3/MM3 (ref 3.4–10.8)

## 2021-06-15 PROCEDURE — 99214 OFFICE O/P EST MOD 30 MIN: CPT | Performed by: FAMILY MEDICINE

## 2021-06-15 RX ORDER — VALSARTAN 320 MG/1
320 TABLET ORAL DAILY
Qty: 90 TABLET | Refills: 3 | Status: SHIPPED | OUTPATIENT
Start: 2021-06-15 | End: 2022-08-17

## 2021-06-15 RX ORDER — HYDROCHLOROTHIAZIDE 25 MG/1
25 TABLET ORAL DAILY
Qty: 90 TABLET | Refills: 3 | Status: SHIPPED | OUTPATIENT
Start: 2021-06-15 | End: 2022-04-21 | Stop reason: SDUPTHER

## 2021-07-20 ENCOUNTER — OFFICE VISIT (OUTPATIENT)
Dept: ENDOCRINOLOGY | Age: 61
End: 2021-07-20

## 2021-07-20 VITALS
BODY MASS INDEX: 28.3 KG/M2 | DIASTOLIC BLOOD PRESSURE: 80 MMHG | WEIGHT: 227.6 LBS | HEIGHT: 75 IN | SYSTOLIC BLOOD PRESSURE: 130 MMHG | OXYGEN SATURATION: 98 % | HEART RATE: 79 BPM

## 2021-07-20 DIAGNOSIS — E78.2 MIXED HYPERLIPIDEMIA: ICD-10-CM

## 2021-07-20 DIAGNOSIS — E03.9 ACQUIRED HYPOTHYROIDISM: Primary | ICD-10-CM

## 2021-07-20 DIAGNOSIS — R73.03 PRE-DIABETES: ICD-10-CM

## 2021-07-20 PROCEDURE — 99204 OFFICE O/P NEW MOD 45 MIN: CPT | Performed by: INTERNAL MEDICINE

## 2021-07-20 NOTE — PROGRESS NOTES
"Chief Complaint  Chief Complaint   Patient presents with   • Hypothyroidism   NEW PATIENT : HYPOTHYROIDISM        Subjective          History of Present Illness    Eros Ellis 60 y.o. presents as a new patient for the evaluation of Hypothyroidism. Consulted by ,     Pt reports that [facility] and that time he was dealing weight issues, ED and then was started on  [place] 90 mg po daily, still on it. He is also on testosterone cream 20% - 1 application per day.     They also started him on DHEA but he stopped it about a year ago as it was too expensive.     Today in clinic pt reports that his energy levels are better, no night sweats, he did lose about 15 pounds in the last few months with diet and exercise.   No c/o heat intolerance and no cold intolerance. No c/o tremors, racing of heart and no eye symptoms.   Denied c/o difficulty breathing, swallowing and change in voice.   No family hx of thyroid disease.     No diagnosis of sleep apnea and never had a study. No increase urination, frequency or blood in urine. No diagnosis of prostate hypertrophy.   No hx of blood clots or CAD.   No family hx of prostate cancer.         Reviewed primary care physician's/consulting physician documentation and lab results         I have reviewed the patient's allergies, medicines, past medical hx, family hx and social hx in detail.    Objective   Vital Signs:   /80   Pulse 79   Ht 190.5 cm (75\")   Wt 103 kg (227 lb 9.6 oz)   SpO2 98%   BMI 28.45 kg/m²   Physical Exam   General appearance - no distress  Eyes- anicteric sclera  Ear nose and throat-external ears and nose normal.    Respiratory-normal chest on inspection.  No respiratory distress noted.  Skin-no rashes.  Neuro-alert and oriented x3            Result Review :   The following data was reviewed by: Cornell Ji MD on 07/20/2021:  Office Visit on 06/15/2021   Component Date Value Ref Range Status   • WBC " 06/15/2021 5.38  3.40 - 10.80 10*3/mm3 Final   • RBC 06/15/2021 6.04* 4.14 - 5.80 10*6/mm3 Final   • Hemoglobin 06/15/2021 17.6  13.0 - 17.7 g/dL Final   • Hematocrit 06/15/2021 52.3* 37.5 - 51.0 % Final   • MCV 06/15/2021 86.6  79.0 - 97.0 fL Final   • MCH 06/15/2021 29.1  26.6 - 33.0 pg Final   • MCHC 06/15/2021 33.7  31.5 - 35.7 g/dL Final   • RDW 06/15/2021 13.7  12.3 - 15.4 % Final   • Platelets 06/15/2021 161  140 - 450 10*3/mm3 Final   • Glucose 06/15/2021 108* 65 - 99 mg/dL Final   • BUN 06/15/2021 17  6 - 20 mg/dL Final   • Creatinine 06/15/2021 1.33* 0.76 - 1.27 mg/dL Final   • eGFR Non  Am 06/15/2021 55* >60 mL/min/1.73 Final    Comment: GFR Normal >60  Chronic Kidney Disease <60  Kidney Failure <15     • eGFR  Am 06/15/2021 67  >60 mL/min/1.73 Final   • BUN/Creatinine Ratio 06/15/2021 12.8  7.0 - 25.0 Final   • Sodium 06/15/2021 143  136 - 145 mmol/L Final   • Potassium 06/15/2021 4.0  3.5 - 5.2 mmol/L Final   • Chloride 06/15/2021 104  98 - 107 mmol/L Final   • Total CO2 06/15/2021 29.7* 22.0 - 29.0 mmol/L Final   • Calcium 06/15/2021 9.8  8.6 - 10.5 mg/dL Final   • Total Protein 06/15/2021 6.8  6.0 - 8.5 g/dL Final   • Albumin 06/15/2021 4.70  3.50 - 5.20 g/dL Final   • Globulin 06/15/2021 2.1  gm/dL Final   • A/G Ratio 06/15/2021 2.2  g/dL Final   • Total Bilirubin 06/15/2021 0.7  0.0 - 1.2 mg/dL Final   • Alkaline Phosphatase 06/15/2021 85  39 - 117 U/L Final   • AST (SGOT) 06/15/2021 20  1 - 40 U/L Final   • ALT (SGPT) 06/15/2021 23  1 - 41 U/L Final   • Hemoglobin A1C 06/15/2021 5.70* 4.80 - 5.60 % Final    Comment: Hemoglobin A1C Ranges:  Increased Risk for Diabetes  5.7% to 6.4%  Diabetes                     >= 6.5%  Diabetic Goal                < 7.0%     • Total Cholesterol 06/15/2021 183  0 - 200 mg/dL Final    Comment: Cholesterol Reference Ranges  (U.S. Department of Health and Human Services ATP III  Classifications)  Desirable          <200 mg/dL  Borderline High    200-239  mg/dL  High Risk          >240 mg/dL  Triglyceride Reference Ranges  (U.S. Department of Health and Human Services ATP III  Classifications)  Normal           <150 mg/dL  Borderline High  150-199 mg/dL  High             200-499 mg/dL  Very High        >500 mg/dL  HDL Reference Ranges  (U.S. Department of Health and Human Services ATP III  Classifcations)  Low     <40 mg/dl (major risk factor for CHD)  High    >60 mg/dl ('negative' risk factor for CHD)  LDL Reference Ranges  (U.S. Department of Health and Human Services ATP III  Classifcations)  Optimal          <100 mg/dL  Near Optimal     100-129 mg/dL  Borderline High  130-159 mg/dL  High             160-189 mg/dL  Very High        >189 mg/dL     • Triglycerides 06/15/2021 109  0 - 150 mg/dL Final   • HDL Cholesterol 06/15/2021 46  40 - 60 mg/dL Final   • VLDL Cholesterol [place] 06/15/2021 20  5 - 40 mg/dL Final   • LDL Chol Calc ([facility]) 06/15/2021 117* 0 - 100 mg/dL Final     Data reviewed: PCP documentation and referral documentation        Results Review:    I reviewed the patient's new clinical results.     Assessment and Plan    Problem List Items Addressed This Visit        Other    Hyperlipidemia (Chronic)    Relevant Orders    TSH    T4, Free    T3, Free    Basic Metabolic Panel    Lipid Panel    Hemoglobin A1c    Vitamin D 25 Hydroxy    Vitamin B12 & Folate    Hypothyroidism - Primary (Chronic)    Relevant Orders    TSH    Thyroid Peroxidase Antibody    T4, Free    T3, Free    TSH    T4, Free    T3, Free    Basic Metabolic Panel    Lipid Panel    Hemoglobin A1c    Vitamin D 25 Hydroxy    Vitamin B12 & Folate      Other Visit Diagnoses     Pre-diabetes        Relevant Orders    TSH    T4, Free    T3, Free    Basic Metabolic Panel    Lipid Panel    Hemoglobin A1c    Vitamin D 25 Hydroxy    Vitamin B12 & Folate          Hypothyroidism-levels are not stable  Check thyroid panel  Discontinue the [place] Thyroid and start the patient on Synthroid or Unithroid  Adjust  "the dosage based on the blood work-up and the patient's weight    Hypogonadotrophic hypogonadism-unclear etiology  Explained to the patient the risks of testosterone replacement  Counseled the patient to consider coming off of the testosterone replacement given the side effects of coronary artery disease, stroke and blood clots.  Patient would want his thyroid levels to be stable before we would consider discontinuing the testosterone replacement.    Also counseled the patient if he intends to remain on the testosterone replacement he needs to consider this medication through the urology.    Prediabetes  Counseled about diet and exercise regimen.    Hyperlipidemia  Discussed about dietary restrictions and the benefits of the statins.    Interpreted the blood work-up/imaging results performed by the primary care/consulting physician -    Refills sent to pharmacy    Follow Up     Patient was given instructions and counseling regarding her condition or for health maintenance advice. Please see specific information pulled into the AVS if appropriate.       Thank you for asking me to see your patient, Eros Ellis in consultation.         Cornell Ji MD  07/20/21      EMR Dragon / transcription disclaimer:     \"Dictated utilizing Dragon dictation\".              "

## 2021-07-21 LAB
T3FREE SERPL-MCNC: 4.1 PG/ML (ref 2–4.4)
T4 FREE SERPL-MCNC: 1.03 NG/DL (ref 0.93–1.7)
THYROPEROXIDASE AB SERPL-ACNC: <8 IU/ML (ref 0–34)
TSH SERPL DL<=0.005 MIU/L-ACNC: 0.39 UIU/ML (ref 0.27–4.2)

## 2021-07-22 RX ORDER — LEVOTHYROXINE SODIUM 0.07 MG/1
75 TABLET ORAL DAILY
Qty: 90 TABLET | Refills: 3 | Status: SHIPPED | OUTPATIENT
Start: 2021-07-22 | End: 2022-03-10

## 2021-12-21 ENCOUNTER — OFFICE VISIT (OUTPATIENT)
Dept: INTERNAL MEDICINE | Facility: CLINIC | Age: 61
End: 2021-12-21

## 2021-12-21 VITALS
OXYGEN SATURATION: 98 % | RESPIRATION RATE: 16 BRPM | TEMPERATURE: 97.5 F | DIASTOLIC BLOOD PRESSURE: 72 MMHG | HEIGHT: 75 IN | HEART RATE: 70 BPM | BODY MASS INDEX: 29.27 KG/M2 | SYSTOLIC BLOOD PRESSURE: 130 MMHG | WEIGHT: 235.4 LBS

## 2021-12-21 DIAGNOSIS — R73.03 PREDIABETES: ICD-10-CM

## 2021-12-21 DIAGNOSIS — I10 BENIGN ESSENTIAL HYPERTENSION: Primary | ICD-10-CM

## 2021-12-21 PROCEDURE — 99214 OFFICE O/P EST MOD 30 MIN: CPT | Performed by: FAMILY MEDICINE

## 2021-12-21 NOTE — PROGRESS NOTES
"Chief Complaint  Hypertension (follow up ) and Hyperlipidemia    Subjective          Eros Ellis presents to [facility]  History of Present Illness     Hypertension - stable.  Patient taking medication as prescribed.  Denies chest pain, shortness of breath, headache, lower extremity edema.  Patient is taking valsartan 320mg daily, HCTZ 25mg daily.  Running around 120s/70s at home.    Prediabetic - stable. Last A1c was 5.70 in June 2021.  He has put a few pounds back on from last time.  Will get labs with endocrine before his next visit.    Objective   Vital Signs:   /72 (BP Location: Left arm, Patient Position: Sitting, Cuff Size: Adult)   Pulse 70   Temp 97.5 °F (36.4 °C) (Temporal)   Resp 16   Ht 190.5 cm (75\")   Wt 107 kg (235 lb 6.4 oz)   SpO2 98%   BMI 29.42 kg/m²     Physical Exam  Vitals and nursing note reviewed.   Constitutional:       General: He is not in acute distress.     Appearance: Normal appearance.   Cardiovascular:      Rate and Rhythm: Normal rate and regular rhythm.      Heart sounds: Normal heart sounds. No murmur heard.      Pulmonary:      Effort: Pulmonary effort is normal.      Breath sounds: Normal breath sounds.   Neurological:      Mental Status: He is alert.        Result Review :   The following data was reviewed by: Siddharth Buckley MD on 12/21/2021:  Common labs    Common Labsle 6/15/21 6/15/21 6/15/21 6/15/21    0820 0820 0820 0820   Glucose  108 (A)     BUN  17     Creatinine  1.33 (A)     eGFR Non  Am  55 (A)     eGFR African Am  67     Sodium  143     Potassium  4.0     Chloride  104     Calcium  9.8     Total Protein  6.8     Albumin  4.70     Total Bilirubin  0.7     Alkaline Phosphatase  85     AST (SGOT)  20     ALT (SGPT)  23     WBC 5.38      Hemoglobin 17.6      Hematocrit 52.3 (A)      Platelets 161      Total Cholesterol    183   Triglycerides    109   HDL Cholesterol    46   LDL Cholesterol     117 (A) "   Hemoglobin A1C   5.70 (A)    (A) Abnormal value       Comments are available for some flowsheets but are not being displayed.                     Assessment and Plan    Diagnoses and all orders for this visit:    1. Benign essential hypertension (Primary)    2. Prediabetes      Continue blood pressure medication as above as his hypertension is stable.  Prediabetes is currently stable and will get a repeat lab next month with endocrine and encouraged him to continue working on his weight loss as he did gain a few pounds this time.  He assured me he is going to start working on it again.      Follow Up   Return in about 4 months (around 4/25/2022) for Annual physical.  Patient was given instructions and counseling regarding his condition or for health maintenance advice. Please see specific information pulled into the AVS if appropriate.

## 2022-03-10 ENCOUNTER — OFFICE VISIT (OUTPATIENT)
Dept: ENDOCRINOLOGY | Age: 62
End: 2022-03-10

## 2022-03-10 VITALS
WEIGHT: 235.8 LBS | BODY MASS INDEX: 29.32 KG/M2 | SYSTOLIC BLOOD PRESSURE: 125 MMHG | DIASTOLIC BLOOD PRESSURE: 78 MMHG | HEIGHT: 75 IN | OXYGEN SATURATION: 98 % | HEART RATE: 78 BPM

## 2022-03-10 DIAGNOSIS — E03.9 ACQUIRED HYPOTHYROIDISM: Primary | ICD-10-CM

## 2022-03-10 DIAGNOSIS — R73.03 PRE-DIABETES: ICD-10-CM

## 2022-03-10 DIAGNOSIS — E78.2 MIXED HYPERLIPIDEMIA: ICD-10-CM

## 2022-03-10 PROCEDURE — 99214 OFFICE O/P EST MOD 30 MIN: CPT | Performed by: INTERNAL MEDICINE

## 2022-03-10 RX ORDER — LEVOTHYROXINE SODIUM 75 UG/1
75 TABLET ORAL DAILY
Qty: 30 TABLET | Refills: 11 | Status: SHIPPED | OUTPATIENT
Start: 2022-03-10 | End: 2022-03-28 | Stop reason: SDUPTHER

## 2022-03-10 NOTE — PROGRESS NOTES
"Chief Complaint  Chief Complaint   Patient presents with   • Hypothyroidism       Subjective          History of Present Illness    Eros Ellis 61 y.o. presents as a F/u patient for the evaluation of Hypothyroidism.     Today in clinic patient reports that he is on levothyroxine 75 mcg oral daily.  He used to be on NP thyroid which was changed by me during his last visit.    Today in clinic he does report that his energy levels are okay, weight has been stable.  No other significant hyper or hypothyroid symptoms.  But does complain of sore muscles especially in his shoulder area and apparently he looked up online and this could be a side effect of the levothyroxine per the patient.        Reviewed primary care physician's/consulting physician documentation and lab results         I have reviewed the patient's allergies, medicines, past medical hx, family hx and social hx in detail.    Objective   Vital Signs:   /78   Pulse 78   Ht 190.5 cm (75\")   Wt 107 kg (235 lb 12.8 oz)   SpO2 98%   BMI 29.47 kg/m²   Physical Exam   General appearance - no distress  Eyes- anicteric sclera  Ear nose and throat-external ears and nose normal.    Respiratory-normal chest on inspection.  No respiratory distress noted.  Skin-no rashes.  Neuro-alert and oriented x3            Result Review :   The following data was reviewed by: Cornell Ji MD on 03/10/2022:  Office Visit on 07/20/2021   Component Date Value Ref Range Status   • TSH 07/20/2021 0.388  0.270 - 4.200 uIU/mL Final   • Thyroid Peroxidase Antibody 07/20/2021 <8  0 - 34 IU/mL Final   • Free T4 07/20/2021 1.03  0.93 - 1.70 ng/dL Final    Results may be falsely increased if patient taking Biotin.   • T3, Free 07/20/2021 4.1  2.0 - 4.4 pg/mL Final     Data reviewed: PCP notes       Results Review:    I reviewed the patient's new clinical results.     Assessment and Plan    Problem List Items Addressed This Visit        Other    Hyperlipidemia (Chronic)    " "Relevant Orders    TSH    T4, Free    T3, Free    Basic Metabolic Panel    Hemoglobin A1c    Lipid Panel    Vitamin B12 & Folate    Vitamin D 25 Hydroxy    Hypothyroidism - Primary (Chronic)    Relevant Medications    Unithroid 75 MCG tablet    Other Relevant Orders    TSH    T4, Free    T3, Free    Basic Metabolic Panel    Hemoglobin A1c    Lipid Panel    Vitamin B12 & Folate    Vitamin D 25 Hydroxy      Other Visit Diagnoses     Pre-diabetes        Relevant Orders    TSH    T4, Free    T3, Free    Basic Metabolic Panel    Hemoglobin A1c    Lipid Panel    Vitamin B12 & Folate    Vitamin D 25 Hydroxy        Hypothyroidism  Change levothyroxine to Unithroid.  Start Unithroid 75 mcg oral daily.    Check HbA1c.    Changing the prescription to Unithroid to see if patient will have some improvement in his symptoms of sore muscles.    Interpreted the blood work-up/imaging results performed by the primary care/consulting physician -    Refills sent to pharmacy    Follow Up     Patient was given instructions and counseling regarding her condition or for health maintenance advice. Please see specific information pulled into the AVS if appropriate.       Thank you for asking me to see your patient, Eros Ellis in consultation.         Cornell Ji MD  03/10/22      EMR Dragon / transcription disclaimer:     \"Dictated utilizing Dragon dictation\".              "

## 2022-03-28 RX ORDER — LEVOTHYROXINE SODIUM 75 UG/1
75 TABLET ORAL DAILY
Qty: 30 TABLET | Refills: 11 | Status: SHIPPED | OUTPATIENT
Start: 2022-03-28 | End: 2022-09-26 | Stop reason: SDUPTHER

## 2022-04-21 ENCOUNTER — OFFICE VISIT (OUTPATIENT)
Dept: INTERNAL MEDICINE | Facility: CLINIC | Age: 62
End: 2022-04-21

## 2022-04-21 VITALS
OXYGEN SATURATION: 99 % | DIASTOLIC BLOOD PRESSURE: 78 MMHG | WEIGHT: 239 LBS | HEIGHT: 75 IN | BODY MASS INDEX: 29.72 KG/M2 | HEART RATE: 78 BPM | SYSTOLIC BLOOD PRESSURE: 118 MMHG | TEMPERATURE: 98.2 F

## 2022-04-21 DIAGNOSIS — E53.8 VITAMIN B12 DEFICIENCY: ICD-10-CM

## 2022-04-21 DIAGNOSIS — Z00.00 ENCOUNTER FOR HEALTH MAINTENANCE EXAMINATION: Primary | ICD-10-CM

## 2022-04-21 DIAGNOSIS — E03.9 ACQUIRED HYPOTHYROIDISM: ICD-10-CM

## 2022-04-21 DIAGNOSIS — I10 BENIGN ESSENTIAL HYPERTENSION: ICD-10-CM

## 2022-04-21 DIAGNOSIS — R73.03 PREDIABETES: ICD-10-CM

## 2022-04-21 DIAGNOSIS — E78.2 MIXED HYPERLIPIDEMIA: ICD-10-CM

## 2022-04-21 DIAGNOSIS — E55.9 VITAMIN D DEFICIENCY: ICD-10-CM

## 2022-04-21 PROCEDURE — 99396 PREV VISIT EST AGE 40-64: CPT | Performed by: FAMILY MEDICINE

## 2022-04-21 RX ORDER — HYDROCHLOROTHIAZIDE 25 MG/1
25 TABLET ORAL DAILY
Qty: 90 TABLET | Refills: 4 | Status: SHIPPED | OUTPATIENT
Start: 2022-04-21

## 2022-04-21 NOTE — PROGRESS NOTES
"Chief Complaint  Annual Exam and Hypertension    Subjective            Eros Ellis presents to [facility]IMARY [facility]  History of Present Illness    CPE- Patient reporting that health is doing well overall.  Patient is here today for annual physical.  Eating a balanced diet.  Taking his medication as prescribed for his blood pressure and prediabetes and hypothyroidism.  So far these conditions have been stable.  Blood pressure is excellent today.    Labs: Looks like he has labs ordered for endocrine but not seeing them in a while.  Would like to have done here.    Colorectal cancer screening: [facility]      Review of Systems      Objective   Vital Signs:   /78 (BP Location: Left arm, Patient Position: Sitting, Cuff Size: Adult)   Pulse 78   Temp 98.2 °F (36.8 °C) (Temporal)   Ht 190.5 cm (75\")   Wt 108 kg (239 lb)   SpO2 99%   BMI 29.87 kg/m²     Physical Exam  Vitals and nursing note reviewed.   Constitutional:       General: He is not in acute distress.     Appearance: Normal appearance.   HENT:      Right Ear: Tympanic membrane, ear canal and external ear normal. There is no impacted cerumen.      Left Ear: Tympanic membrane, ear canal and external ear normal. There is no impacted cerumen.      Nose: Nose normal.      Mouth/Throat:      Mouth: Mucous membranes are moist.   Eyes:      General:         Right eye: No discharge.         Left eye: No discharge.      Conjunctiva/sclera: Conjunctivae normal.   Cardiovascular:      Rate and Rhythm: Normal rate and regular rhythm.      Pulses: Normal pulses.      Heart sounds: Normal heart sounds.   Pulmonary:      Effort: Pulmonary effort is normal.      Breath sounds: Normal breath sounds.   Abdominal:      General: Bowel sounds are normal. There is no distension.      Palpations: Abdomen is soft.      Tenderness: There is no abdominal tenderness.   Musculoskeletal:      Cervical back: Neck supple.      Right lower leg: No edema.      Left " lower leg: No edema.   Lymphadenopathy:      Cervical: No cervical adenopathy.   Skin:     General: Skin is warm.      Findings: No rash.   Neurological:      General: No focal deficit present.      Mental Status: He is alert. Mental status is at baseline.   Psychiatric:         Mood and Affect: Mood normal.         Behavior: Behavior normal.        Result Review :   The following data was reviewed by: Siddharth Buckley MD on 04/21/2022:  Common labs    Common Labsle 6/15/21 6/15/21 6/15/21 6/15/21    0820 0820 0820 0820   Glucose  108 (A)     BUN  17     Creatinine  1.33 (A)     eGFR Non  Am  55 (A)     eGFR African Am  67     Sodium  143     Potassium  4.0     Chloride  104     Calcium  9.8     Total Protein  6.8     Albumin  4.70     Total Bilirubin  0.7     Alkaline Phosphatase  85     AST (SGOT)  20     ALT (SGPT)  23     WBC 5.38      Hemoglobin 17.6      Hematocrit 52.3 (A)      Platelets 161      Total Cholesterol    183   Triglycerides    109   HDL Cholesterol    46   LDL Cholesterol     117 (A)   Hemoglobin A1C   5.70 (A)    (A) Abnormal value       Comments are available for some flowsheets but are not being displayed.                     Assessment and Plan    Diagnoses and all orders for this visit:    1. Encounter for health maintenance examination (Primary)  -     CBC & Differential  -     Comprehensive Metabolic Panel  -     Hemoglobin A1c  -     Lipid Panel With LDL / HDL Ratio  -     T4, Free  -     TSH  -     T3, free  -     Vitamin B12  -     Folate  -     Vitamin D 25 Hydroxy    2. Prediabetes  -     CBC & Differential  -     Comprehensive Metabolic Panel  -     Hemoglobin A1c  -     Lipid Panel With LDL / HDL Ratio  -     T4, Free  -     TSH  -     T3, free  -     Vitamin B12  -     Folate  -     Vitamin D 25 Hydroxy    3. Acquired hypothyroidism  -     CBC & Differential  -     Comprehensive Metabolic Panel  -     Hemoglobin A1c  -     Lipid Panel With LDL / HDL Ratio  -     T4, Free  -      TSH  -     T3, free  -     Vitamin B12  -     Folate  -     Vitamin D 25 Hydroxy    4. Mixed hyperlipidemia  -     CBC & Differential  -     Comprehensive Metabolic Panel  -     Hemoglobin A1c  -     Lipid Panel With LDL / HDL Ratio  -     T4, Free  -     TSH  -     T3, free  -     Vitamin B12  -     Folate  -     Vitamin D 25 Hydroxy    5. Benign essential hypertension  -     hydroCHLOROthiazide (HYDRODIURIL) 25 MG tablet; Take 1 tablet by mouth Daily.  Dispense: 90 tablet; Refill: 4      Will get the endocrine labs here and forward to Dr. Ji.        The patient was counseled regarding nutrition, physical activity, healthy weight, injury prevention, misuse of tobacco, alcohol and illicit drugs, mental health, immunizations, and screenings.    Blood pressure is stable and will refill his medication.  I will use the labs I am ordering for endocrine to ensure that his kidneys are doing well with it.    Follow Up   Return in about 6 months (around 10/21/2022) for Next scheduled follow up - HTN.  Patient was given instructions and counseling regarding his condition or for health maintenance advice. Please see specific information pulled into the AVS if appropriate.

## 2022-04-22 LAB
25(OH)D3+25(OH)D2 SERPL-MCNC: 55 NG/ML (ref 30–100)
ALBUMIN SERPL-MCNC: 4.3 G/DL (ref 3.8–4.9)
ALBUMIN/GLOB SERPL: 1.7 {RATIO} (ref 1.2–2.2)
ALP SERPL-CCNC: 85 IU/L (ref 44–121)
ALT SERPL-CCNC: 24 IU/L (ref 0–44)
AST SERPL-CCNC: 23 IU/L (ref 0–40)
BASOPHILS # BLD AUTO: 0.1 X10E3/UL (ref 0–0.2)
BASOPHILS NFR BLD AUTO: 2 %
BILIRUB SERPL-MCNC: 0.6 MG/DL (ref 0–1.2)
BUN SERPL-MCNC: 12 MG/DL (ref 8–27)
BUN/CREAT SERPL: 10 (ref 10–24)
CALCIUM SERPL-MCNC: 9.6 MG/DL (ref 8.6–10.2)
CHLORIDE SERPL-SCNC: 102 MMOL/L (ref 96–106)
CHOLEST SERPL-MCNC: 185 MG/DL (ref 100–199)
CO2 SERPL-SCNC: 25 MMOL/L (ref 20–29)
CREAT SERPL-MCNC: 1.24 MG/DL (ref 0.76–1.27)
EGFRCR SERPLBLD CKD-EPI 2021: 67 ML/MIN/1.73
EOSINOPHIL # BLD AUTO: 0.1 X10E3/UL (ref 0–0.4)
EOSINOPHIL NFR BLD AUTO: 2 %
ERYTHROCYTE [DISTWIDTH] IN BLOOD BY AUTOMATED COUNT: 13.7 % (ref 11.6–15.4)
FOLATE SERPL-MCNC: 17.5 NG/ML
GLOBULIN SER CALC-MCNC: 2.6 G/DL (ref 1.5–4.5)
GLUCOSE SERPL-MCNC: 109 MG/DL (ref 65–99)
HBA1C MFR BLD: 6.3 % (ref 4.8–5.6)
HCT VFR BLD AUTO: 50.4 % (ref 37.5–51)
HDLC SERPL-MCNC: 41 MG/DL
HGB BLD-MCNC: 16.8 G/DL (ref 13–17.7)
IMM GRANULOCYTES # BLD AUTO: 0 X10E3/UL (ref 0–0.1)
IMM GRANULOCYTES NFR BLD AUTO: 0 %
LDLC SERPL CALC-MCNC: 123 MG/DL (ref 0–99)
LDLC/HDLC SERPL: 3 RATIO (ref 0–3.6)
LYMPHOCYTES # BLD AUTO: 1.2 X10E3/UL (ref 0.7–3.1)
LYMPHOCYTES NFR BLD AUTO: 24 %
MCH RBC QN AUTO: 28 PG (ref 26.6–33)
MCHC RBC AUTO-ENTMCNC: 33.3 G/DL (ref 31.5–35.7)
MCV RBC AUTO: 84 FL (ref 79–97)
MONOCYTES # BLD AUTO: 0.4 X10E3/UL (ref 0.1–0.9)
MONOCYTES NFR BLD AUTO: 8 %
NEUTROPHILS # BLD AUTO: 3.4 X10E3/UL (ref 1.4–7)
NEUTROPHILS NFR BLD AUTO: 64 %
PLATELET # BLD AUTO: 181 X10E3/UL (ref 150–450)
POTASSIUM SERPL-SCNC: 4.3 MMOL/L (ref 3.5–5.2)
PROT SERPL-MCNC: 6.9 G/DL (ref 6–8.5)
RBC # BLD AUTO: 6.01 X10E6/UL (ref 4.14–5.8)
SODIUM SERPL-SCNC: 142 MMOL/L (ref 134–144)
T3FREE SERPL-MCNC: 3.4 PG/ML (ref 2–4.4)
T4 FREE SERPL-MCNC: 1.5 NG/DL (ref 0.82–1.77)
TRIGL SERPL-MCNC: 115 MG/DL (ref 0–149)
TSH SERPL DL<=0.005 MIU/L-ACNC: 1.26 UIU/ML (ref 0.45–4.5)
VIT B12 SERPL-MCNC: 686 PG/ML (ref 232–1245)
VLDLC SERPL CALC-MCNC: 21 MG/DL (ref 5–40)
WBC # BLD AUTO: 5.2 X10E3/UL (ref 3.4–10.8)

## 2022-08-17 DIAGNOSIS — I10 ESSENTIAL HYPERTENSION: ICD-10-CM

## 2022-08-17 RX ORDER — VALSARTAN 320 MG/1
TABLET ORAL
Qty: 90 TABLET | Refills: 3 | Status: SHIPPED | OUTPATIENT
Start: 2022-08-17

## 2022-09-26 ENCOUNTER — OFFICE VISIT (OUTPATIENT)
Dept: ENDOCRINOLOGY | Age: 62
End: 2022-09-26

## 2022-09-26 VITALS
BODY MASS INDEX: 29.47 KG/M2 | HEIGHT: 75 IN | WEIGHT: 237 LBS | TEMPERATURE: 98.4 F | DIASTOLIC BLOOD PRESSURE: 88 MMHG | HEART RATE: 73 BPM | OXYGEN SATURATION: 95 % | SYSTOLIC BLOOD PRESSURE: 138 MMHG

## 2022-09-26 DIAGNOSIS — E03.9 ACQUIRED HYPOTHYROIDISM: Primary | ICD-10-CM

## 2022-09-26 DIAGNOSIS — E78.2 MIXED HYPERLIPIDEMIA: ICD-10-CM

## 2022-09-26 DIAGNOSIS — R73.03 PRE-DIABETES: ICD-10-CM

## 2022-09-26 PROCEDURE — 99214 OFFICE O/P EST MOD 30 MIN: CPT | Performed by: NURSE PRACTITIONER

## 2022-09-26 RX ORDER — LEVOTHYROXINE SODIUM 75 UG/1
75 TABLET ORAL DAILY
Qty: 90 TABLET | Refills: 2 | Status: SHIPPED | OUTPATIENT
Start: 2022-09-26 | End: 2023-03-15 | Stop reason: SDUPTHER

## 2022-09-26 NOTE — PROGRESS NOTES
"Chief Complaint  Hyperthyroidism (HERE FOR 4 MO FOLLOW UP WITH LAB CHECK )    Subjective        Eros Ellis presents to [facility] ENDOCR[facility]OLOG[facility]  History of Present Illness     Hypothyroidism  Currently on Unithroid 75 mcg daily  Currently on testosterone replacement from 25 again  Feeling well overall  Lab Results   Component Value Date    TSH 1.060 09/12/2022         Prediabetes  Improved and controlled with lifestyle  Not currently on medication  Lab Results   Component Value Date    HGBA1C 5.90 (H) 09/12/2022         Hyperlipidemia  Not currently on statin  Lab Results   Component Value Date    CHLPL 177 09/12/2022    TRIG 120 09/12/2022    HDL 44 09/12/2022     (H) 09/12/2022         Objective   Vital Signs:  /88   Pulse 73   Temp 98.4 °F (36.9 °C) (Temporal)   Ht 190.5 cm (75\")   Wt 108 kg (237 lb)   SpO2 95%   BMI 29.62 kg/m²   Estimated body mass index is 29.62 kg/m² as calculated from the following:    Height as of this encounter: 190.5 cm (75\").    Weight as of this encounter: 108 kg (237 lb).          Physical Exam  Vitals reviewed.   Constitutional:       General: He is not in acute distress.  HENT:      Head: Normocephalic and atraumatic.   Cardiovascular:      Rate and Rhythm: Normal rate and regular rhythm.   Pulmonary:      Effort: Pulmonary effort is normal. No respiratory distress.   Musculoskeletal:         General: No signs of injury. Normal range of motion.      Cervical back: Normal range of motion and neck supple.   Skin:     General: Skin is warm and dry.   Neurological:      Mental Status: He is alert and oriented to person, place, and time. Mental status is at baseline.   Psychiatric:         Mood and Affect: Mood normal.         Behavior: Behavior normal.         Thought Content: Thought content normal.         Judgment: Judgment normal.        Result Review :  The following data was reviewed by: BRITTNEE Guaman on 09/26/2022:  Common labs  "   Common Labs 4/21/22 4/21/22 4/21/22 4/21/22 9/12/22 9/12/22 9/12/22    0926 0926 0926 0926 0836 0836 0836   Glucose  109 (A)   123 (A)     BUN  12   20     Creatinine  1.24   1.59 (A)     Sodium  142   138     Potassium  4.3   4.0     Chloride  102   100     Calcium  9.6   9.4     Total Protein  6.9        Albumin  4.3        Total Bilirubin  0.6        Alkaline Phosphatase  85        AST (SGOT)  23        ALT (SGPT)  24        WBC 5.2         Hemoglobin 16.8         Hematocrit 50.4         Platelets 181         Total Cholesterol    185   177   Triglycerides    115   120   HDL Cholesterol    41   44   LDL Cholesterol     123 (A)   111 (A)   Hemoglobin A1C   6.3 (A)   5.90 (A)    (A) Abnormal value       Comments are available for some flowsheets but are not being displayed.                     Assessment and Plan   Diagnoses and all orders for this visit:    1. Acquired hypothyroidism (Primary)  -     TSH; Future  -     T4, Free; Future  -     T3, Free; Future  -     Hemoglobin A1c; Future  -     Comprehensive Metabolic Panel; Future  -     Lipid Panel; Future  -     Microalbumin / Creatinine Urine Ratio - Urine, Clean Catch; Future    2. Pre-diabetes  -     TSH; Future  -     T4, Free; Future  -     T3, Free; Future  -     Hemoglobin A1c; Future  -     Comprehensive Metabolic Panel; Future  -     Lipid Panel; Future  -     Microalbumin / Creatinine Urine Ratio - Urine, Clean Catch; Future    3. Mixed hyperlipidemia  -     TSH; Future  -     T4, Free; Future  -     T3, Free; Future  -     Hemoglobin A1c; Future  -     Comprehensive Metabolic Panel; Future  -     Lipid Panel; Future  -     Microalbumin / Creatinine Urine Ratio - Urine, Clean Catch; Future    Other orders  -     Unithroid 75 MCG tablet; Take 1 tablet by mouth Daily.  Dispense: 90 tablet; Refill: 2             Follow Up   No follow-ups on file.     Will repeat Bmp with Dr Bukcley at upcoming visit   Continue current T4 dosing, thyroid labs  favorable  Continue lifestyle modifications to prevent progression of diabetes  LDL borderline, triglycerides favorable-would like to hold on statin for now.  No strong family history of early death from cardiovascular disease    Patient was given instructions and counseling regarding his condition or for health maintenance advice. Please see specific information pulled into the AVS if appropriate.     Mayelin Marina, APRN

## 2022-10-31 ENCOUNTER — OFFICE VISIT (OUTPATIENT)
Dept: INTERNAL MEDICINE | Facility: CLINIC | Age: 62
End: 2022-10-31

## 2022-10-31 VITALS
RESPIRATION RATE: 19 BRPM | HEIGHT: 75 IN | WEIGHT: 234 LBS | TEMPERATURE: 97.1 F | HEART RATE: 56 BPM | BODY MASS INDEX: 29.09 KG/M2 | OXYGEN SATURATION: 97 % | SYSTOLIC BLOOD PRESSURE: 120 MMHG | DIASTOLIC BLOOD PRESSURE: 79 MMHG

## 2022-10-31 DIAGNOSIS — N18.31 STAGE 3A CHRONIC KIDNEY DISEASE: Chronic | ICD-10-CM

## 2022-10-31 DIAGNOSIS — Z23 NEED FOR TDAP VACCINATION: ICD-10-CM

## 2022-10-31 DIAGNOSIS — S61.411A LACERATION OF RIGHT HAND WITHOUT FOREIGN BODY, INITIAL ENCOUNTER: ICD-10-CM

## 2022-10-31 DIAGNOSIS — Z23 NEED FOR SHINGLES VACCINE: ICD-10-CM

## 2022-10-31 DIAGNOSIS — I10 ESSENTIAL HYPERTENSION: Primary | ICD-10-CM

## 2022-10-31 DIAGNOSIS — Z23 NEED FOR INFLUENZA VACCINATION: ICD-10-CM

## 2022-10-31 PROCEDURE — 99214 OFFICE O/P EST MOD 30 MIN: CPT | Performed by: FAMILY MEDICINE

## 2022-10-31 PROCEDURE — 90471 IMMUNIZATION ADMIN: CPT | Performed by: FAMILY MEDICINE

## 2022-10-31 PROCEDURE — 90686 IIV4 VACC NO PRSV 0.5 ML IM: CPT | Performed by: FAMILY MEDICINE

## 2022-10-31 NOTE — PROGRESS NOTES
"Chief Complaint  Hypertension    Subjective        Eros Ellis presents to [facility]  History of Present Illness     He is following up today regarding his blood pressure.  He has hypertension and is controlled with valsartan, HCTZ.  He checks his blood pressures at home and they are running within normal range for his age and risk factors.    I reviewed his note from endocrinology from September as well as his labs from endocrinology.  It seemed that he is doing fairly well with most of his labs but I am noticing some decline in his kidneys.  Last creatinine was 1.59 with an EGFR 49.4.  He has the risk factor of hypertension.  His creatinine was 1.24 with an EGFR at 67 in April of this year but the previous time to that his creatinine was 1.33 with an EGFR of 55.  This is all suggesting he is likely developing some chronic kidney disease stage IIIa.  Given that diagnosis, according to the [facility]HA his blood pressure goal would be below 130/80.    Right hand laceration from hawk while training it to hunt.  He is due for tdap.  He is healing well.  No erythema and is caring for it with antibiotic ointment.    Objective   Vital Signs:  /79 (BP Location: Left arm, Patient Position: Sitting, Cuff Size: Large Adult)   Pulse 56   Temp 97.1 °F (36.2 °C)   Resp 19   Ht 190.5 cm (75\")   Wt 106 kg (234 lb)   SpO2 97%   BMI 29.25 kg/m²   Estimated body mass index is 29.25 kg/m² as calculated from the following:    Height as of this encounter: 190.5 cm (75\").    Weight as of this encounter: 106 kg (234 lb).          Physical Exam  Vitals and nursing note reviewed.   Constitutional:       General: He is not in acute distress.     Appearance: Normal appearance.   Cardiovascular:      Rate and Rhythm: Normal rate and regular rhythm.      Heart sounds: Normal heart sounds. No murmur heard.  Pulmonary:      Effort: Pulmonary effort is normal.      Breath sounds: Normal breath sounds. "   Musculoskeletal:      Right hand: Laceration present.      Comments: About 3cm, healing, well approximated   Neurological:      Mental Status: He is alert.        Result Review :  The following data was reviewed by: Siddharth Buckley MD on 10/31/2022:  Common labs    Common Labs 4/21/22 4/21/22 4/21/22 4/21/22 9/12/22 9/12/22 9/12/22    0926 0926 0926 0926 0836 0836 0836   Glucose  109 (A)   123 (A)     BUN  12   20     Creatinine  1.24   1.59 (A)     Sodium  142   138     Potassium  4.3   4.0     Chloride  102   100     Calcium  9.6   9.4     Total Protein  6.9        Albumin  4.3        Total Bilirubin  0.6        Alkaline Phosphatase  85        AST (SGOT)  23        ALT (SGPT)  24        WBC 5.2         Hemoglobin 16.8         Hematocrit 50.4         Platelets 181         Total Cholesterol    185   177   Triglycerides    115   120   HDL Cholesterol    41   44   LDL Cholesterol     123 (A)   111 (A)   Hemoglobin A1C   6.3 (A)   5.90 (A)    (A) Abnormal value       Comments are available for some flowsheets but are not being displayed.                     Assessment and Plan   Diagnoses and all orders for this visit:    1. Essential hypertension (Primary)  -     Basic Metabolic Panel    2. Stage 3a chronic kidney disease (HCC)  -     Basic Metabolic Panel  -     Microalbumin / Creatinine Urine Ratio - Urine, Clean Catch    3. Need for influenza vaccination  -     FluLaval/Fluarix/Fluzone >6 Months    4. Need for Tdap vaccination  -     Cancel: Tdap Vaccine Greater Than or Equal To 8yo IM  -     Tdap Vaccine Greater Than or Equal To 8yo IM; Future    5. Need for shingles vaccine  -     Shingrix Vaccine; Future    6. Laceration of right hand without foreign body, initial encounter      HTN is well controlled and continue current meds.    New diagnosis of chronic kidney disease stage IIIa.  I will get microalbumin and BMP today.  I discussed with him that his goal for his blood pressure would be below 130/80 on a  consistent basis.  Advised no NSAIDs and plenty of fluids through the day to keep his kidneys hydrated.  Gave some information about chronic kidney disease in his AVS.    He needs shingrix #2 and will return as a nurse visit to have that done.  We are out of Tdap so he will make a nurse appointment for a few days from now.  No need for sutures.  Continue wound care as above.           Follow Up   Return in about 6 months (around 4/30/2023) for Annual physical.  Patient was given instructions and counseling regarding his condition or for health maintenance advice. Please see specific information pulled into the AVS if appropriate.

## 2022-11-01 LAB
ALBUMIN/CREAT UR: 9 MG/G CREAT (ref 0–29)
BUN SERPL-MCNC: 12 MG/DL (ref 8–23)
BUN/CREAT SERPL: 9.4 (ref 7–25)
CALCIUM SERPL-MCNC: 9.6 MG/DL (ref 8.6–10.5)
CHLORIDE SERPL-SCNC: 101 MMOL/L (ref 98–107)
CO2 SERPL-SCNC: 29 MMOL/L (ref 22–29)
CREAT SERPL-MCNC: 1.28 MG/DL (ref 0.76–1.27)
CREAT UR-MCNC: 134.5 MG/DL
EGFRCR SERPLBLD CKD-EPI 2021: 64.1 ML/MIN/1.73
GLUCOSE SERPL-MCNC: 116 MG/DL (ref 65–99)
MICROALBUMIN UR-MCNC: 12.2 UG/ML
POTASSIUM SERPL-SCNC: 4.4 MMOL/L (ref 3.5–5.2)
SODIUM SERPL-SCNC: 141 MMOL/L (ref 136–145)

## 2022-11-02 PROBLEM — N18.2 STAGE 2 CHRONIC KIDNEY DISEASE: Chronic | Status: ACTIVE | Noted: 2022-10-31

## 2022-11-03 ENCOUNTER — CLINICAL SUPPORT (OUTPATIENT)
Dept: INTERNAL MEDICINE | Facility: CLINIC | Age: 62
End: 2022-11-03

## 2022-11-03 DIAGNOSIS — Z23 NEED FOR TDAP VACCINATION: Primary | ICD-10-CM

## 2022-11-03 PROCEDURE — 90715 TDAP VACCINE 7 YRS/> IM: CPT | Performed by: FAMILY MEDICINE

## 2022-11-03 PROCEDURE — 90471 IMMUNIZATION ADMIN: CPT | Performed by: FAMILY MEDICINE

## 2022-11-17 ENCOUNTER — CLINICAL SUPPORT (OUTPATIENT)
Dept: INTERNAL MEDICINE | Facility: CLINIC | Age: 62
End: 2022-11-17

## 2022-11-17 DIAGNOSIS — Z23 NEED FOR SHINGLES VACCINE: ICD-10-CM

## 2022-11-17 PROCEDURE — 90471 IMMUNIZATION ADMIN: CPT | Performed by: FAMILY MEDICINE

## 2022-11-17 PROCEDURE — 90750 HZV VACC RECOMBINANT IM: CPT | Performed by: FAMILY MEDICINE

## 2023-03-15 ENCOUNTER — OFFICE VISIT (OUTPATIENT)
Dept: ENDOCRINOLOGY | Age: 63
End: 2023-03-15
Payer: COMMERCIAL

## 2023-03-15 VITALS
OXYGEN SATURATION: 97 % | DIASTOLIC BLOOD PRESSURE: 94 MMHG | HEART RATE: 54 BPM | SYSTOLIC BLOOD PRESSURE: 150 MMHG | HEIGHT: 75 IN | WEIGHT: 243.8 LBS | TEMPERATURE: 97.1 F | BODY MASS INDEX: 30.31 KG/M2

## 2023-03-15 DIAGNOSIS — E03.9 ACQUIRED HYPOTHYROIDISM: Primary | ICD-10-CM

## 2023-03-15 DIAGNOSIS — R73.03 PREDIABETES: ICD-10-CM

## 2023-03-15 PROCEDURE — 99214 OFFICE O/P EST MOD 30 MIN: CPT | Performed by: INTERNAL MEDICINE

## 2023-03-15 RX ORDER — LEVOTHYROXINE SODIUM 75 UG/1
75 TABLET ORAL DAILY
Qty: 90 TABLET | Refills: 3 | Status: SHIPPED | OUTPATIENT
Start: 2023-03-15

## 2023-03-15 NOTE — PROGRESS NOTES
Chief complaint:  Hypothyroidism    HPI:    - 62 year old male here for hypothyroidism and prediabetes  - Last visit was 9/2021  - Interval events since last visit: no hospitalizations or major illnesses  - Is currently on Unithroid 75 mcg daily  - Denies missing any does of  levothyroxine  - Takes her levothyroxine at the same time every day, on an empty stomach, and not with hot beverages  - Has started working out more recently    The following portions of the patient's history were reviewed and updated as appropriate: allergies, current medications, past family history, past medical history, past social history, past surgical history and problem list.    Review of Systems   Constitutional: Negative.    HENT: Negative.    Eyes: Negative.    Respiratory: Negative.    Cardiovascular: Negative.    Gastrointestinal: Negative.    Endocrine: Positive for heat intolerance.   Allergic/Immunologic: Negative.    Neurological: Negative.    Hematological: Negative.        Objective     Vitals:    03/15/23 1125   BP: 150/94   Pulse: 54   Temp: 97.1 °F (36.2 °C)   SpO2: 97%        Physical Exam  Constitutional:       Appearance: Normal appearance.   HENT:      Head: Normocephalic and atraumatic.   Eyes:      General: No scleral icterus.     Conjunctiva/sclera: Conjunctivae normal.   Pulmonary:      Effort: Pulmonary effort is normal.   Neurological:      Mental Status: He is alert.      Gait: Gait normal.   Psychiatric:         Mood and Affect: Mood normal.         Behavior: Behavior normal.         Thought Content: Thought content normal.         Judgment: Judgment normal.         Assessment & Plan   Diagnoses and all orders for this visit:    1. Acquired hypothyroidism (Primary)  -     Unithroid 75 MCG tablet; Take 1 tablet by mouth Daily.  Dispense: 90 tablet; Refill: 3    - He is going to have upcoming lab work via his PCP in April  - Cont. Unithroid 75 mcg daily    2. Prediabetes  - He has started exercising more  - He  is going to get an A1c in April    - Return to clinic in 1 year

## 2023-05-09 ENCOUNTER — OFFICE VISIT (OUTPATIENT)
Dept: INTERNAL MEDICINE | Facility: CLINIC | Age: 63
End: 2023-05-09
Payer: COMMERCIAL

## 2023-05-09 VITALS
RESPIRATION RATE: 18 BRPM | OXYGEN SATURATION: 99 % | SYSTOLIC BLOOD PRESSURE: 140 MMHG | WEIGHT: 243 LBS | BODY MASS INDEX: 30.21 KG/M2 | HEIGHT: 75 IN | DIASTOLIC BLOOD PRESSURE: 86 MMHG

## 2023-05-09 DIAGNOSIS — Z00.00 ENCOUNTER FOR HEALTH MAINTENANCE EXAMINATION: Primary | ICD-10-CM

## 2023-05-09 DIAGNOSIS — Z12.5 SCREENING FOR PROSTATE CANCER: ICD-10-CM

## 2023-05-09 DIAGNOSIS — H61.23 BILATERAL IMPACTED CERUMEN: ICD-10-CM

## 2023-05-09 DIAGNOSIS — E03.9 ACQUIRED HYPOTHYROIDISM: ICD-10-CM

## 2023-05-09 DIAGNOSIS — R73.03 PREDIABETES: ICD-10-CM

## 2023-05-09 LAB
ALBUMIN SERPL-MCNC: 4.5 G/DL (ref 3.5–5.2)
ALBUMIN/GLOB SERPL: 1.8 G/DL
ALP SERPL-CCNC: 90 U/L (ref 39–117)
ALT SERPL-CCNC: 27 U/L (ref 1–41)
AST SERPL-CCNC: 19 U/L (ref 1–40)
BILIRUB SERPL-MCNC: 0.6 MG/DL (ref 0–1.2)
BUN SERPL-MCNC: 18 MG/DL (ref 8–23)
BUN/CREAT SERPL: 13.5 (ref 7–25)
CALCIUM SERPL-MCNC: 9.7 MG/DL (ref 8.6–10.5)
CHLORIDE SERPL-SCNC: 105 MMOL/L (ref 98–107)
CHOLEST SERPL-MCNC: 211 MG/DL (ref 0–200)
CO2 SERPL-SCNC: 28.1 MMOL/L (ref 22–29)
CREAT SERPL-MCNC: 1.33 MG/DL (ref 0.76–1.27)
EGFRCR SERPLBLD CKD-EPI 2021: 60.8 ML/MIN/1.73
ERYTHROCYTE [DISTWIDTH] IN BLOOD BY AUTOMATED COUNT: 13.3 % (ref 12.3–15.4)
GLOBULIN SER CALC-MCNC: 2.5 GM/DL
GLUCOSE SERPL-MCNC: 129 MG/DL (ref 65–99)
HBA1C MFR BLD: 6 % (ref 4.8–5.6)
HCT VFR BLD AUTO: 53.3 % (ref 37.5–51)
HDLC SERPL-MCNC: 41 MG/DL (ref 40–60)
HGB BLD-MCNC: 17.3 G/DL (ref 13–17.7)
LDLC SERPL CALC-MCNC: 143 MG/DL (ref 0–100)
LDLC/HDLC SERPL: 3.41 {RATIO}
MCH RBC QN AUTO: 28.3 PG (ref 26.6–33)
MCHC RBC AUTO-ENTMCNC: 32.5 G/DL (ref 31.5–35.7)
MCV RBC AUTO: 87.2 FL (ref 79–97)
PLATELET # BLD AUTO: 184 10*3/MM3 (ref 140–450)
POTASSIUM SERPL-SCNC: 3.9 MMOL/L (ref 3.5–5.2)
PROT SERPL-MCNC: 7 G/DL (ref 6–8.5)
PSA SERPL-MCNC: 3.64 NG/ML (ref 0–4)
RBC # BLD AUTO: 6.11 10*6/MM3 (ref 4.14–5.8)
SODIUM SERPL-SCNC: 142 MMOL/L (ref 136–145)
T4 FREE SERPL-MCNC: 1.06 NG/DL (ref 0.93–1.7)
TRIGL SERPL-MCNC: 151 MG/DL (ref 0–150)
TSH SERPL DL<=0.005 MIU/L-ACNC: 1.51 UIU/ML (ref 0.27–4.2)
VLDLC SERPL CALC-MCNC: 27 MG/DL (ref 5–40)
WBC # BLD AUTO: 6.4 10*3/MM3 (ref 3.4–10.8)

## 2023-05-09 PROCEDURE — 99396 PREV VISIT EST AGE 40-64: CPT | Performed by: FAMILY MEDICINE

## 2023-05-09 NOTE — PROGRESS NOTES
"Chief Complaint   Patient presents with   • Annual Exam       Subjective       CPE- Patient is here today for annual physical.  Overall has been doing well other than his allergies which are really bothersome.  He says he is having some difficulty hearing out of his left ear especially but has a fullness in his ears bilaterally.    Labs: Due for routine labs    Colorectal cancer screening: Up-to-date, next due 2029    Vitals:    05/09/23 0753 05/09/23 0802   BP: 140/90 140/86   BP Location: Left arm    Patient Position: Sitting    Cuff Size: Small Adult    Resp: 18    SpO2: 99%    Weight: 110 kg (243 lb)    Height: 190.5 cm (75\")          Physical Exam  Vitals and nursing note reviewed.   Constitutional:       General: He is not in acute distress.     Appearance: Normal appearance.   HENT:      Right Ear: Hearing, ear canal and external ear normal. There is impacted cerumen.      Left Ear: Hearing, ear canal and external ear normal. There is impacted cerumen.      Nose: Nose normal.      Mouth/Throat:      Mouth: Mucous membranes are moist.   Eyes:      General:         Right eye: No discharge.         Left eye: No discharge.      Conjunctiva/sclera: Conjunctivae normal.   Cardiovascular:      Rate and Rhythm: Normal rate and regular rhythm.      Pulses: Normal pulses.      Heart sounds: Normal heart sounds.   Pulmonary:      Effort: Pulmonary effort is normal.      Breath sounds: Normal breath sounds.   Abdominal:      General: Bowel sounds are normal. There is no distension.      Palpations: Abdomen is soft.      Tenderness: There is no abdominal tenderness.   Musculoskeletal:      Cervical back: Neck supple.      Right lower leg: No edema.      Left lower leg: No edema.   Lymphadenopathy:      Cervical: No cervical adenopathy.   Skin:     General: Skin is warm.      Findings: No rash.   Neurological:      General: No focal deficit present.      Mental Status: He is alert. Mental status is at baseline. "   Psychiatric:         Mood and Affect: Mood normal.         Behavior: Behavior normal.           Common labs        9/12/2022    08:36 10/31/2022    09:22   Common Labs   Glucose 123   116     BUN 20   12     Creatinine 1.59   1.28     Sodium 138   141     Potassium 4.0   4.4     Chloride 100   101     Calcium 9.4   9.6     Total Cholesterol 177      Triglycerides 120      HDL Cholesterol 44      LDL Cholesterol  111      Hemoglobin A1C 5.90      Microalbumin, Urine  12.2             Diagnoses and all orders for this visit:    1. Encounter for health maintenance examination (Primary)  -     CBC (No Diff)  -     Comprehensive Metabolic Panel  -     Lipid Panel With LDL / HDL Ratio    2. Prediabetes  -     Hemoglobin A1c    3. Acquired hypothyroidism  -     TSH  -     T4, Free    4. Screening for prostate cancer  -     PSA Screen    5. Bilateral impacted cerumen           The patient was counseled regarding nutrition, physical activity, healthy weight, injury prevention, misuse of tobacco, alcohol and illicit drugs, mental health, immunizations, and screenings.    He has impacted cerumen in both ears but especially so in the left ear.  This is so impacted that we would not be able to irrigate this today.  I recommended that he buy some Debrox and treat his ears daily or nightly for the next week and then return for irrigation in the office.  This will allow the earwax to soften enough to be able to irrigate effectively without injury.    Return in about 1 year (around 5/16/2024) for Annual physical.

## 2023-05-16 ENCOUNTER — OFFICE VISIT (OUTPATIENT)
Dept: INTERNAL MEDICINE | Facility: CLINIC | Age: 63
End: 2023-05-16
Payer: COMMERCIAL

## 2023-05-16 VITALS
OXYGEN SATURATION: 95 % | HEART RATE: 50 BPM | HEIGHT: 75 IN | SYSTOLIC BLOOD PRESSURE: 168 MMHG | WEIGHT: 245.4 LBS | DIASTOLIC BLOOD PRESSURE: 81 MMHG | BODY MASS INDEX: 30.51 KG/M2 | TEMPERATURE: 97.9 F

## 2023-05-16 DIAGNOSIS — H66.90 ACUTE OTITIS MEDIA, UNSPECIFIED OTITIS MEDIA TYPE: Primary | ICD-10-CM

## 2023-05-16 DIAGNOSIS — H61.23 BILATERAL IMPACTED CERUMEN: ICD-10-CM

## 2023-05-16 RX ORDER — AMOXICILLIN 500 MG/1
1000 CAPSULE ORAL 2 TIMES DAILY
Qty: 40 CAPSULE | Refills: 0 | Status: SHIPPED | OUTPATIENT
Start: 2023-05-16 | End: 2023-05-26

## 2023-05-16 NOTE — PROGRESS NOTES
"Answers for HPI/ROS submitted by the patient on 5/10/2023  What is the primary reason for your visit?: Ear Pain            Chief Complaint  Ear Fullness and Earache     Subjective:      History of Present Illness {CC  Problem List  Visit  Diagnosis   Encounters  Notes  Medications  Labs  Result Review Imaging  Media :23}     Eros Ellis presents to [facility] for:    He is a patient of MD Marcio.  Patient presents today for cerumen impaction removal.  Patient recently saw Dr. Buckley and was educated on buying a Debrox to soften earwax in order for removal.      History of Present Illness     Earache   There is pain in both ears. This is a recurrent problem. The current episode started 1 to 4 weeks ago. The problem has been unchanged. There has been no fever. The pain is at a severity of 1/10. Associated symptoms include hearing loss and rhinorrhea. Pertinent negatives include no abdominal pain, coughing, diarrhea, ear discharge, headaches, neck pain, rash, sore throat or vomiting.          I have reviewed patient's medical history, any new submitted information provided by patient or medical assistant and updated medical record.      Objective:      Physical Exam  HENT:      Right Ear: Decreased hearing noted. Swelling and tenderness present. There is impacted cerumen. Tympanic membrane is erythematous.      Left Ear: Decreased hearing noted. Swelling and tenderness present. There is impacted cerumen.      Ears:          Result Review  Data Reviewed:{ Labs  Result Review  Imaging  Med Tab  Media :23}                Vital Signs:   /81 (BP Location: Left arm, Patient Position: Sitting, Cuff Size: Large Adult)   Pulse 50   Temp 97.9 °F (36.6 °C) (Oral)   Ht 190.5 cm (75\")   Wt 111 kg (245 lb 6.4 oz)   SpO2 95%   BMI 30.67 kg/m²   Ear Cerumen Removal    Date/Time: 5/16/2023 11:29 AM  Performed by: Virginia De La Rosa APRN  Authorized by: Virginia De La Rosa " BRITTNEE Mccoy   Consent: Verbal consent obtained.  Consent given by: patient  Patient understanding: patient states understanding of the procedure being performed  Patient consent: the patient's understanding of the procedure matches consent given    Anesthesia:  Local Anesthetic: none  Location details: left ear and right ear  Comments: Patient experienced tenderness throughout the procedure.  Right ear canal was cleared by instrumentation and irrigation.  TM able to be visualized.  Left ear partially cleared.  Canal still impacted.  TM unable to be visualized.  Hearing in right ear had improved after procedure.  Hearing in left ear had slight improvement after procedure.  Procedure type: instrumentation, irrigation, curette, ear spatula          Requested Prescriptions     Signed Prescriptions Disp Refills   • amoxicillin (AMOXIL) 500 MG capsule 40 capsule 0     Sig: Take 2 capsules by mouth 2 (Two) Times a Day for 10 days.       Routine medications provided by this office will also be refilled via pharmacy request.       Current Outpatient Medications:   •  Ascorbic Acid Buffered (BUFFERED VITAMIN C) 1000 MG capsule, TAKE ONE Capsule BY MOUTH EVERY DAY, Disp: , Rfl: 2  •  aspirin 81 MG tablet, Take by mouth daily., Disp: , Rfl:   •  Cholecalciferol (VITAMIN D3) 5000 units tablet tablet, ALTERNATE chewing THREE AND TWO TABLETS BY MOUTH EVERY OTHER DAY WITH FOOD, Disp: , Rfl: 2  •  hydroCHLOROthiazide (HYDRODIURIL) 25 MG tablet, Take 1 tablet by mouth Daily., Disp: 90 tablet, Rfl: 4  •  Multiple Vitamins-Minerals (CLINICAL NUTRIENTS 50-PLUS MEN) tablet, TAKE TWO Tablets BY MOUTH TWICE DAILY, Disp: , Rfl: 2  •  sildenafil (REVATIO) 20 MG tablet, Take 2-3 tablets by MOUTH ONE hour prior TO sexual activity as needed, Disp: , Rfl: 0  •  Testosterone Compounding Kit 20 % cream, Apply 1 click twice daily to scrotum area, or can do 2 clicks once daily in the morning, Disp: , Rfl:   •  Unithroid 75 MCG tablet, Take 1  tablet by mouth Daily., Disp: 90 tablet, Rfl: 3  •  valsartan (DIOVAN) 320 MG tablet, TAKE ONE TABLET BY MOUTH DAILY, Disp: 90 tablet, Rfl: 3  •  amoxicillin (AMOXIL) 500 MG capsule, Take 2 capsules by mouth 2 (Two) Times a Day for 10 days., Disp: 40 capsule, Rfl: 0     Assessment and Plan:      Assessment and Plan {CC Problem List  Visit Diagnosis  ROS  Review (Popup)  Health Maintenance  Quality  BestPractice  Medications  SmartSets  SnapShot Encounters  Media :23}     Problem List Items Addressed This Visit    None  Visit Diagnoses     Acute otitis media, unspecified otitis media type    -  Primary    Relevant Medications    amoxicillin (AMOXIL) 500 MG capsule    Bilateral impacted cerumen        Relevant Orders    Cerumen Removal        Educated patient on new medication, dosing, and side effects.  We will see patient back in 1 week to recheck bilateral ears and to attempt irrigation again of left ear.  Educated patient on using water in the shower and avoiding use of Q-tips.    Follow Up {Instructions Charge Capture  Follow-up Communications :23}     Return in 1 week (on 5/23/2023) for Recheck.      Patient was given instructions and counseling regarding his condition or for health maintenance advice. Please see specific information pulled into the AVS if appropriate.    Dragon disclaimer:   Much of this encounter note is an electronic transcription/translation of spoken language to printed text. The electronic translation of spoken language may permit erroneous, or at times, nonsensical words or phrases to be inadvertently transcribed; Although I have reviewed the note for such errors, some may still exist.     Additional Patient Counseling:       There are no Patient Instructions on file for this visit.

## 2023-05-25 ENCOUNTER — OFFICE VISIT (OUTPATIENT)
Dept: INTERNAL MEDICINE | Facility: CLINIC | Age: 63
End: 2023-05-25
Payer: COMMERCIAL

## 2023-05-25 VITALS
TEMPERATURE: 97.9 F | DIASTOLIC BLOOD PRESSURE: 71 MMHG | WEIGHT: 239.4 LBS | HEART RATE: 42 BPM | BODY MASS INDEX: 29.77 KG/M2 | HEIGHT: 75 IN | OXYGEN SATURATION: 95 % | SYSTOLIC BLOOD PRESSURE: 160 MMHG

## 2023-05-25 DIAGNOSIS — H61.22 IMPACTED CERUMEN OF LEFT EAR: Primary | ICD-10-CM

## 2023-05-25 NOTE — PROGRESS NOTES
"Answers for HPI/ROS submitted by the patient on 5/23/2023  What is the primary reason for your visit?: Other  Please describe your symptoms.: Ear check follow up  Have you had these symptoms before?: Yes  How long have you been having these symptoms?: Greater than 2 weeks            Chief Complaint  Ear Fullness (F/u)     Subjective:      History of Present Illness {CC  Problem List  Visit  Diagnosis   Encounters  Notes  Medications  Labs  Result Review Imaging  Media :23}     Eros Ellis presents to [facility] for:      History of Present Illness    pt was recently here for ear complaints. We noted his bilateral ears were impacted with cerumen.He is back today as a follow up for the left ear impaction. At last visit he was sent back home for more debrox to soften the wax as the irrigation would not clear the canal.     I have reviewed patient's medical history, any new submitted information provided by patient or medical assistant and updated medical record.      Objective:      Physical Exam  HENT:      Right Ear: Hearing, tympanic membrane, ear canal and external ear normal. There is no impacted cerumen.      Left Ear: Hearing normal. There is impacted cerumen.      Ears:        Comments: Left canal erythematous and swollen from procedure.        Result Review  Data Reviewed:{ Labs  Result Review  Imaging  Med Tab  Media :23}                Vital Signs:   /71 (BP Location: Left arm, Patient Position: Sitting, Cuff Size: Large Adult)   Pulse (!) 42   Temp 97.9 °F (36.6 °C) (Oral)   Ht 190.5 cm (75\")   Wt 109 kg (239 lb 6.4 oz)   SpO2 95%   BMI 29.92 kg/m²   Ear Cerumen Removal    Date/Time: 5/25/2023 1:09 PM  Performed by: Virginia De La Rosa APRN  Authorized by: Virginia De La Rosa APRN   Consent: Verbal consent obtained.  Consent given by: patient    Anesthesia:  Local Anesthetic: none  Location details: left ear  Patient tolerance: patient " tolerated the procedure well with no immediate complications  Comments: Pt tolerated well. I was able to remove a large portion of wax. I was able to visualize a portion of the TM.  Procedure type: instrumentation, curette   Sedation:  Patient sedated: no             Requested Prescriptions      No prescriptions requested or ordered in this encounter       Routine medications provided by this office will also be refilled via pharmacy request.       Current Outpatient Medications:   •  amoxicillin (AMOXIL) 500 MG capsule, Take 2 capsules by mouth 2 (Two) Times a Day for 10 days., Disp: 40 capsule, Rfl: 0  •  Ascorbic Acid Buffered (BUFFERED VITAMIN C) 1000 MG capsule, TAKE ONE Capsule BY MOUTH EVERY DAY, Disp: , Rfl: 2  •  aspirin 81 MG tablet, Take by mouth daily., Disp: , Rfl:   •  Cholecalciferol (VITAMIN D3) 5000 units tablet tablet, ALTERNATE chewing THREE AND TWO TABLETS BY MOUTH EVERY OTHER DAY WITH FOOD, Disp: , Rfl: 2  •  hydroCHLOROthiazide (HYDRODIURIL) 25 MG tablet, Take 1 tablet by mouth Daily., Disp: 90 tablet, Rfl: 4  •  Multiple Vitamins-Minerals (CLINICAL NUTRIENTS 50-PLUS MEN) tablet, TAKE TWO Tablets BY MOUTH TWICE DAILY, Disp: , Rfl: 2  •  sildenafil (REVATIO) 20 MG tablet, Take 2-3 tablets by MOUTH ONE hour prior TO sexual activity as needed, Disp: , Rfl: 0  •  Testosterone Compounding Kit 20 % cream, Apply 1 click twice daily to scrotum area, or can do 2 clicks once daily in the morning, Disp: , Rfl:   •  Unithroid 75 MCG tablet, Take 1 tablet by mouth Daily., Disp: 90 tablet, Rfl: 3  •  valsartan (DIOVAN) 320 MG tablet, TAKE ONE TABLET BY MOUTH DAILY, Disp: 90 tablet, Rfl: 3     Assessment and Plan:      Assessment and Plan {CC Problem List  Visit Diagnosis  ROS  Review (Popup)  Health Maintenance  Quality  BestPractice  Medications  SmartSets  SnapShot Encounters  Media :23}     Problem List Items Addressed This Visit    None  Visit Diagnoses     Impacted cerumen of left ear    -   Primary            Educated pt to run water down ear canal during showers and to refrain from using q-tips. Educated pt to come back if symptoms persist or worsen.     Follow Up {Instructions Charge Capture  Follow-up Communications :23}     Return if symptoms worsen or fail to improve.      Patient was given instructions and counseling regarding his condition or for health maintenance advice. Please see specific information pulled into the AVS if appropriate.    Dragon disclaimer:   Much of this encounter note is an electronic transcription/translation of spoken language to printed text. The electronic translation of spoken language may permit erroneous, or at times, nonsensical words or phrases to be inadvertently transcribed; Although I have reviewed the note for such errors, some may still exist.     Additional Patient Counseling:       There are no Patient Instructions on file for this visit.

## 2023-08-06 DIAGNOSIS — I10 ESSENTIAL HYPERTENSION: ICD-10-CM

## 2023-08-07 RX ORDER — VALSARTAN 320 MG/1
TABLET ORAL
Qty: 90 TABLET | Refills: 3 | Status: SHIPPED | OUTPATIENT
Start: 2023-08-07

## 2023-08-11 DIAGNOSIS — I10 ESSENTIAL HYPERTENSION: ICD-10-CM

## 2023-08-14 RX ORDER — VALSARTAN 320 MG/1
TABLET ORAL
Qty: 90 TABLET | Refills: 3 | OUTPATIENT
Start: 2023-08-14

## 2023-12-05 ENCOUNTER — OFFICE VISIT (OUTPATIENT)
Dept: INTERNAL MEDICINE | Facility: CLINIC | Age: 63
End: 2023-12-05
Payer: COMMERCIAL

## 2023-12-05 VITALS
HEIGHT: 75 IN | BODY MASS INDEX: 29.22 KG/M2 | RESPIRATION RATE: 18 BRPM | SYSTOLIC BLOOD PRESSURE: 138 MMHG | WEIGHT: 235 LBS | DIASTOLIC BLOOD PRESSURE: 80 MMHG | HEART RATE: 44 BPM

## 2023-12-05 DIAGNOSIS — I10 BENIGN ESSENTIAL HYPERTENSION: Primary | Chronic | ICD-10-CM

## 2023-12-05 DIAGNOSIS — R73.03 PREDIABETES: Chronic | ICD-10-CM

## 2023-12-05 DIAGNOSIS — N18.31 STAGE 3A CHRONIC KIDNEY DISEASE: Chronic | ICD-10-CM

## 2023-12-05 DIAGNOSIS — E78.2 MIXED HYPERLIPIDEMIA: Chronic | ICD-10-CM

## 2023-12-05 LAB
ALBUMIN SERPL-MCNC: 4.7 G/DL (ref 3.5–5.2)
ALBUMIN/GLOB SERPL: 2 G/DL
ALP SERPL-CCNC: 88 U/L (ref 39–117)
ALT SERPL-CCNC: 26 U/L (ref 1–41)
AST SERPL-CCNC: 27 U/L (ref 1–40)
BILIRUB SERPL-MCNC: 0.8 MG/DL (ref 0–1.2)
BUN SERPL-MCNC: 15 MG/DL (ref 8–23)
BUN/CREAT SERPL: 11 (ref 7–25)
CALCIUM SERPL-MCNC: 10.1 MG/DL (ref 8.6–10.5)
CHLORIDE SERPL-SCNC: 103 MMOL/L (ref 98–107)
CHOLEST SERPL-MCNC: 193 MG/DL (ref 0–200)
CO2 SERPL-SCNC: 30.1 MMOL/L (ref 22–29)
CREAT SERPL-MCNC: 1.36 MG/DL (ref 0.76–1.27)
EGFRCR SERPLBLD CKD-EPI 2021: 59.2 ML/MIN/1.73
GLOBULIN SER CALC-MCNC: 2.3 GM/DL
GLUCOSE SERPL-MCNC: 117 MG/DL (ref 65–99)
HBA1C MFR BLD: 6 % (ref 4.8–5.6)
HDLC SERPL-MCNC: 40 MG/DL (ref 40–60)
LDLC SERPL CALC-MCNC: 134 MG/DL (ref 0–100)
LDLC/HDLC SERPL: 3.29 {RATIO}
POTASSIUM SERPL-SCNC: 4.4 MMOL/L (ref 3.5–5.2)
PROT SERPL-MCNC: 7 G/DL (ref 6–8.5)
SODIUM SERPL-SCNC: 142 MMOL/L (ref 136–145)
TRIGL SERPL-MCNC: 107 MG/DL (ref 0–150)
VLDLC SERPL CALC-MCNC: 19 MG/DL (ref 5–40)

## 2023-12-05 NOTE — PATIENT INSTRUCTIONS
"MyChart Tips:    MyChart is a useful part of our patient care program and is a great way for us to communicate lab results and for you to request refills.  Not all medical questions are appropriate for MyChart such as new medical issues that require taking a history, performing an exam, getting labs/studies or researching medical questions needed to be addressed in the office.    Examples of medical issues that are APPROPRIATE for MyChart:  -Follow-up on problems we have already addressed in a visit such as home testing results, blood pressure readings, glucose readings  -Questions that can be answered with a simple \"yes\" or \"no\"    Communication that is NOT APPROPRIATE for MyChart:  -MyChart is not for new problems, serious problems or urgent problems.  Urgent matters should be addressed by phone, in the office, urgent care or the ER.  -[employer] messages are not email.  Staff will check messages each weekday.  We strive for a 48-hour turnaround on messaging.    -[employer] is not for private issues.  Messages are received first by our office staff.    Please allow up to 7 business days for lab results to be sent through [employer] to you before contacting your provider.  Sometimes we are waiting for results to get back from the lab and also your provider needs time to analyze them thoroughly before making recommendations.  While the internet has great resources, it is not a substitute for interpreting lab results.    We also ask that you not send [employer] messages and telephone calls regarding the same issue simultaneously.  This slows down the process of returning your call/message and confuses staff.    Be mindful that [email or web address] messages and telephone calls become part of your permanent medical record.    Lastly, your provider cannot access your [email or web address].  It is a service which communicates with the EHR (Electronic Health Record).  Sometimes there are errors in [employer] that staff and providers cannot see and these errors " are not part of your EHR.  Vaccines and screening reminders have been incorrect in MyChart.    We appreciate your respect for the limitations and boundaries of [employer].

## 2023-12-05 NOTE — PROGRESS NOTES
"Chief Complaint  Follow-up and Hypertension    Subjective        Eros Ellis presents to [facility]  History of Present Illness     He is following up today regarding his hypertension, chronic kidney disease, prediabetes, hyperlipidemia.  He checks his blood pressures at home and they are running within normal range for his age and risk factors.  Blood pressures controlled in the office today.  Last creatinine at baseline 1.33 with EGFR 60.8.  Drinking plenty of fluids.  Taking medication as prescribed below.      Current Outpatient Medications:     Ascorbic Acid Buffered (BUFFERED VITAMIN C) 1000 MG capsule, TAKE ONE Capsule BY MOUTH EVERY DAY, Disp: , Rfl: 2    aspirin 81 MG tablet, Take by mouth daily., Disp: , Rfl:     Cholecalciferol (VITAMIN D3) 5000 units tablet tablet, ALTERNATE chewing THREE AND TWO TABLETS BY MOUTH EVERY OTHER DAY WITH FOOD, Disp: , Rfl: 2    hydroCHLOROthiazide (HYDRODIURIL) 25 MG tablet, TAKE ONE TABLET BY MOUTH DAILY, Disp: 90 tablet, Rfl: 4    Multiple Vitamins-Minerals (CLINICAL NUTRIENTS 50-PLUS MEN) tablet, TAKE TWO Tablets BY MOUTH TWICE DAILY, Disp: , Rfl: 2    sildenafil (REVATIO) 20 MG tablet, Take 2-3 tablets by MOUTH ONE hour prior TO sexual activity as needed, Disp: , Rfl: 0    Testosterone Compounding Kit 20 % cream, Apply 1 click twice daily to scrotum area, or can do 2 clicks once daily in the morning, Disp: , Rfl:     Unithroid 75 MCG tablet, Take 1 tablet by mouth Daily., Disp: 90 tablet, Rfl: 3    valsartan (DIOVAN) 320 MG tablet, TAKE ONE TABLET BY MOUTH DAILY, Disp: 90 tablet, Rfl: 3      Objective   Vital Signs:  /80 (BP Location: Left arm, Patient Position: Sitting, Cuff Size: Small Adult)   Pulse (!) 44   Resp 18   Ht 190.5 cm (75\")   Wt 107 kg (235 lb)   BMI 29.37 kg/m²   Estimated body mass index is 29.37 kg/m² as calculated from the following:    Height as of this encounter: 190.5 cm (75\").    Weight as of this " encounter: 107 kg (235 lb).          Physical Exam  Vitals and nursing note reviewed.   Constitutional:       General: He is not in acute distress.     Appearance: Normal appearance.   Cardiovascular:      Rate and Rhythm: Normal rate and regular rhythm.      Heart sounds: Normal heart sounds. No murmur heard.  Pulmonary:      Effort: Pulmonary effort is normal.      Breath sounds: Normal breath sounds.   Neurological:      Mental Status: He is alert.        Result Review :  The following data was reviewed by: Siddharth Buckley MD on 10/31/2022:  Common labs          5/9/2023    08:18   Common Labs   Glucose 129    BUN 18    Creatinine 1.33    Sodium 142    Potassium 3.9    Chloride 105    Calcium 9.7    Total Protein 7.0    Albumin 4.5    Total Bilirubin 0.6    Alkaline Phosphatase 90    AST (SGOT) 19    ALT (SGPT) 27    WBC 6.40    Hemoglobin 17.3    Hematocrit 53.3    Platelets 184    Total Cholesterol 211    Triglycerides 151    HDL Cholesterol 41    LDL Cholesterol  143    Hemoglobin A1C 6.00    PSA 3.640                Assessment and Plan   Diagnoses and all orders for this visit:    1. Benign essential hypertension (Primary)  -     Comprehensive Metabolic Panel    2. Prediabetes  -     Comprehensive Metabolic Panel  -     Hemoglobin A1c    3. Stage 3a chronic kidney disease  -     Comprehensive Metabolic Panel  -     Microalbumin / Creatinine Urine Ratio - Urine, Clean Catch    4. Mixed hyperlipidemia  -     Lipid Panel With LDL / HDL Ratio        Clinically stable chronic conditions as above.  Continue all medications as above.  Labs reviewed/ordered as above.         Follow Up   Return in about 6 months (around 6/5/2024) for Annual physical.  Patient was given instructions and counseling regarding his condition or for health maintenance advice. Please see specific information pulled into the AVS if appropriate.

## 2024-01-05 ENCOUNTER — TELEPHONE (OUTPATIENT)
Dept: INTERNAL MEDICINE | Facility: CLINIC | Age: 64
End: 2024-01-05
Payer: COMMERCIAL

## 2024-01-05 NOTE — TELEPHONE ENCOUNTER
[facility]    PATIENT'S NAME: [facility]   ATTENDING PHYSICIAN: Mirza Paulson MD   PATIENT ACCOUNT#:   [de-identified]    LOCATION:  [address]  MEDICAL RECORD #:   [id number]       YOB: 1946  ADMISSION DATE:       07/10/2023    HISTORY AND PHYSICAL EXAMINATION    #####EDITING MAY BE REQUIRED#####    CHIEF COMPLAINT:  Pneumonia and possible sepsis. HISTORY OF PRESENT ILLNESS:  The patient is a 80-year-old  male who was brought in today to the hospital for progressive shortness of breath for the last 2 to 3 days, associated with temperature recorded by home health nurse earlier today. The patient was brought into the emergency department for evaluation, and he had a white blood cell count of 21.2 with left shift, hemoglobin 9.2. Chemistry showed GFR of 40, which is below his baseline; BUN and creatinine of 29 and 1.74; bicarb 19. Lactic acid and procalcitonin were obtained. ProBNP was 8700, though patient appears clinically dehydrated. D-dimer is 3.99. Chest x-ray showed _______. CT scan of the chest still pending. PAST MEDICAL HISTORY:  Nonischemic cardiomyopathy, ejection fraction of 35%, status post defibrillator placement. He had a cardiac angiogram a year ago which showed no obstructive coronary artery disease. 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 He had chronic myelogenous leukemia on Gleevec, degenerative joint disease of lumbar spine, osteoarthritis, osteoporosis, chronic kidney disease stage 3, and anemia of chronic kidney disease. The patient is status post biventricular ICD. PAST SURGICAL HISTORY:  Right reverse total shoulder arthroplasty, basal cell carcinoma skin resection, left Achilles tendon repair.     MEDICATIONS:  Please see medication reconciliation list.    ALLERGIES:  No Microalbumin was not completed with rest of labs on 12/5/23. Can the lab order be canceled or would you like patient to return to complete?   known drug allergies. FAMILY HISTORY:  Brother had lung cancer and coronary artery disease. SOCIAL HISTORY:  No tobacco, alcohol, or drug use. Lives with his family. At baseline independent in his basic activities of daily living. REVIEW OF SYSTEMS:  The patient said around 2 to 3 days ago he started developing progressive shortness of breath. He denies any recent dysphagia or choking on liquids or solid food. No clear sick contacts. He had a recorded fever of 101 at home today. Other 12-point review of systems is negative. He reported decreased appetite for the last 2 to 3 days as well. PHYSICAL EXAMINATION:  GENERAL:  Alert and oriented to time, place, and person. Moderate distress. Visibly dyspneic. VITAL SIGNS:  Temperature 98.5, pulse 60, respiratory rate 34, blood pressure 114/52, pulse ox initially 80% on room air, 96% on high-flow nasal cannula oxygen. HEENT:  Atraumatic. Oropharynx clear. Dry mucous membranes. Normal hard and soft palate. Eyes:  Anicteric sclerae. NECK:  Supple. No lymphadenopathy. Trachea midline. LUNGS:  Rhonchi auscultated on both lung fields. HEART:  Regular rate and rhythm. S1 and S2 auscultated. No murmur. ABDOMEN:  Soft, nondistended. No tenderness. Positive bowel sounds. EXTREMITIES:  No peripheral edema, clubbing, or cyanosis. NEUROLOGIC:  Motor and sensory intact. Cranial nerves II through XII are intact. ASSESSMENT:  1. Possible multifocal pneumonitis considering imaging studies and elevated white blood cell count. 2.   Possible acute on chronic systolic heart failure considering his elevated proBNP and low oxygen status. 3.   Elevated troponins. 4.   Mild acute on chronic kidney injury. PLAN:  The patient will be admitted to telemetry floor. Will initiate on IV Lasix, vancomycin, Zosyn. Monitor his respiratory and hemodynamic status. Cardiology and Pulmonary consult. Trend troponin level.   Hold diuresis if blood pressure drops any further. Follow up on lactic acid; if it is elevated, will probably need volume support and stop diuresis. Further recommendations to follow.     Dictated By Minh Thompson MD  d: 07/10/2023 18:06:24  t: 07/10/2023 18:32:25  [facility] [id number]  FB/

## 2024-03-11 ENCOUNTER — OFFICE VISIT (OUTPATIENT)
Dept: ENDOCRINOLOGY | Age: 64
End: 2024-03-11
Payer: COMMERCIAL

## 2024-03-11 VITALS
OXYGEN SATURATION: 98 % | SYSTOLIC BLOOD PRESSURE: 124 MMHG | BODY MASS INDEX: 30.41 KG/M2 | TEMPERATURE: 97.1 F | HEART RATE: 53 BPM | DIASTOLIC BLOOD PRESSURE: 70 MMHG | WEIGHT: 244.6 LBS | HEIGHT: 75 IN

## 2024-03-11 DIAGNOSIS — E03.9 ACQUIRED HYPOTHYROIDISM: Primary | ICD-10-CM

## 2024-03-11 PROCEDURE — 99213 OFFICE O/P EST LOW 20 MIN: CPT | Performed by: INTERNAL MEDICINE

## 2024-03-11 PROCEDURE — G2211 COMPLEX E/M VISIT ADD ON: HCPCS | Performed by: INTERNAL MEDICINE

## 2024-03-11 NOTE — PROGRESS NOTES
Chief complaint/Reason for consult:  Hypothyroidism    HPI:   - 63 year old male here for hypothyroidism  - Last visit was 3/2023  - Interval events since last visit: no hospitalizations or major illnesses  - Is currently on Unithroid 75 mcg daily  - Denies missing any does of  levothyroxine  - Takes her levothyroxine at the same time every day, on an empty stomach, and not with hot beverages    The following portions of the patient's history were reviewed and updated as appropriate: allergies, current medications, past family history, past medical history, past social history, past surgical history, and problem list.    Objective     Vitals:    03/11/24 0758   BP: 124/70   Pulse: 53   Temp: 97.1 °F (36.2 °C)   SpO2: 98%        Physical Exam  Vitals reviewed.   Constitutional:       Appearance: Normal appearance.   HENT:      Head: Normocephalic and atraumatic.   Eyes:      General: No scleral icterus.  Pulmonary:      Effort: Pulmonary effort is normal. No respiratory distress.   Neurological:      Mental Status: He is alert.      Gait: Gait normal.   Psychiatric:         Mood and Affect: Mood normal.         Behavior: Behavior normal.         Thought Content: Thought content normal.         Judgment: Judgment normal.       Assessment & Plan   Hypothyroidism  - Is on Unithroid 75 mcg daily  - Will check TSH and free T4 today and adjust Unithroid based on labs    - Return to clinic in 1 year

## 2024-03-27 DIAGNOSIS — E03.9 ACQUIRED HYPOTHYROIDISM: ICD-10-CM

## 2024-03-27 RX ORDER — LEVOTHYROXINE SODIUM 75 UG/1
75 TABLET ORAL DAILY
Qty: 90 TABLET | Refills: 3 | Status: SHIPPED | OUTPATIENT
Start: 2024-03-27

## 2024-03-27 NOTE — TELEPHONE ENCOUNTER
Rx Refill Note  Requested Prescriptions     Pending Prescriptions Disp Refills    Unithroid 75 MCG tablet [Pharmacy Med Name: UNITHROID 75 MCG TABLET] 90 tablet 3     Sig: TAKE ONE TABLET BY MOUTH DAILY      Last office visit with prescribing clinician: 3/11/2024   Last telemedicine visit with prescribing clinician: Visit date not found   Next office visit with prescribing clinician: 3/11/2025                         Would you like a call back once the refill request has been completed: [] Yes [] No    If the office needs to give you a call back, can they leave a voicemail: [] Yes [] No    Vilma Washburn MA  03/27/24, 08:07 EDT

## 2024-04-02 DIAGNOSIS — E03.9 ACQUIRED HYPOTHYROIDISM: ICD-10-CM

## 2024-04-02 RX ORDER — LEVOTHYROXINE SODIUM 75 UG/1
75 TABLET ORAL DAILY
Qty: 90 TABLET | Refills: 3 | OUTPATIENT
Start: 2024-04-02

## 2024-04-02 NOTE — TELEPHONE ENCOUNTER
Rx Refill Note  Requested Prescriptions     Pending Prescriptions Disp Refills    Unithroid 75 MCG tablet [Pharmacy Med Name: UNITHROID 75 MCG TABLET] 90 tablet 3     Sig: TAKE ONE TABLET BY MOUTH DAILY      Last office visit with prescribing clinician: 3/11/2024   Last telemedicine visit with prescribing clinician: Visit date not found   Next office visit with prescribing clinician: 3/11/2025                         Would you like a call back once the refill request has been completed: [] Yes [] No    If the office needs to give you a call back, can they leave a voicemail: [] Yes [] No    Sadie Washburn  04/02/24, 12:27 EDT

## 2024-04-12 ENCOUNTER — TELEPHONE (OUTPATIENT)
Dept: ENDOCRINOLOGY | Age: 64
End: 2024-04-12
Payer: COMMERCIAL

## 2024-04-12 NOTE — TELEPHONE ENCOUNTER
PATIENT CALLED BECAUSE HE RECEIVED A MESSAGE FROM US REGARDING LABS. HE IS ASKING TO SPEAK TO FELIX BECAUSE HE IS NOT SURE WHY HE NEEDS LABS DONE.       HE IS REQUESTING A CALL BACK ON MONDAY

## 2024-04-15 NOTE — TELEPHONE ENCOUNTER
Pt has a T4 and TSH done on 03/11. Did you see these results? And is pt needing to complete a new set of these lasb?

## 2024-04-15 NOTE — TELEPHONE ENCOUNTER
Called and spoke with pt. Informed pt that we are still needing the TSH. Scheduled the pt for a lab appt. Pt had no further questions or concerns.

## 2024-05-20 ENCOUNTER — OFFICE VISIT (OUTPATIENT)
Dept: INTERNAL MEDICINE | Facility: CLINIC | Age: 64
End: 2024-05-20
Payer: COMMERCIAL

## 2024-05-20 VITALS
BODY MASS INDEX: 29.22 KG/M2 | RESPIRATION RATE: 18 BRPM | HEART RATE: 53 BPM | WEIGHT: 235 LBS | HEIGHT: 75 IN | DIASTOLIC BLOOD PRESSURE: 60 MMHG | SYSTOLIC BLOOD PRESSURE: 128 MMHG | OXYGEN SATURATION: 98 %

## 2024-05-20 DIAGNOSIS — Z12.11 SCREEN FOR COLON CANCER: ICD-10-CM

## 2024-05-20 DIAGNOSIS — Z00.00 ENCOUNTER FOR HEALTH MAINTENANCE EXAMINATION: Primary | ICD-10-CM

## 2024-05-20 PROCEDURE — 99396 PREV VISIT EST AGE 40-64: CPT | Performed by: FAMILY MEDICINE

## 2024-05-20 NOTE — PATIENT INSTRUCTIONS
"MyChart Tips:    [employer] can be a useful part of our patient care program and is a way for us to communicate lab results and for you to request refills.  Here is some information regarding the best way to use [employer] for communication.       Examples of medical issues that are APPROPRIATE for [employer]:  -Follow-up on problems we have already addressed in a visit such as home testing results, blood pressure readings, glucose readings  -Questions that can be answered with a simple \"yes\" or \"no\"  -Please keep communication concise and to the point.  All other types of communication should be held for an office visit.    Communication that is NOT APPROPRIATE for [employer]:  -New problems, serious problems or urgent problems.  Urgent matters should be addressed by phone for advice, in the office, urgent care or the ER.  -Requesting Paxlovid or Antibiotics: These types of problems require an evaluation unless your provider approved this previously.    -[employer] messages are not email.  Staff will check messages each weekday.  We strive for a 48-hour turnaround on messaging.    -[employer] is not for private issues.  Messages are received first by our office staff.    Please be mindful that sometimes it may take longer to receive a response on [employer].  Many times of the year we have high volumes of messages coming into physician inboxes.      Please also be mindful that [employer] messages become part of your permanent medical record.    We appreciate your respect for the limitations and boundaries of [employer].      Lab Results:  Please allow up to 7 business days for lab results to be sent through [employer] to you before contacting your provider.  Sometimes we are waiting for results to get back from the lab and also your provider needs time to analyze them thoroughly before making recommendations.  Also, providers may be out of the office on vacation.      While the internet has great resources, it is not a substitute for " interpreting lab results.

## 2024-05-20 NOTE — PROGRESS NOTES
"Chief Complaint   Patient presents with    Annual Exam       Subjective       CPE- Patient is here today for annual physical.  Overall doing well.  No concerns today.    Labs: PSA was completed about less than a year ago at [facility].    Colorectal cancer screening: states he has not gotten it done since 2015.      Vitals:    05/20/24 0821   BP: 128/60   BP Location: Right arm   Patient Position: Sitting   Cuff Size: Small Adult   Pulse: 53   Resp: 18   SpO2: 98%   Weight: 107 kg (235 lb)   Height: 190.5 cm (75\")     Body mass index is 29.37 kg/m².  BMI is >= 30 and <35. (Class 1 Obesity). The following options were offered after discussion;: exercise counseling/recommendations and nutrition counseling/recommendations        Physical Exam  Vitals and nursing note reviewed.   Constitutional:       General: He is not in acute distress.     Appearance: Normal appearance.   HENT:      Right Ear: Hearing, tympanic membrane and external ear normal.      Left Ear: Hearing, tympanic membrane and external ear normal.      Ears:      Comments: Canals look a little erythematous     Nose: Nose normal.      Mouth/Throat:      Mouth: Mucous membranes are moist.   Eyes:      General:         Right eye: No discharge.         Left eye: No discharge.      Conjunctiva/sclera: Conjunctivae normal.   Cardiovascular:      Rate and Rhythm: Normal rate and regular rhythm.      Pulses: Normal pulses.      Heart sounds: Normal heart sounds.   Pulmonary:      Effort: Pulmonary effort is normal.      Breath sounds: Normal breath sounds.   Abdominal:      General: Bowel sounds are normal. There is no distension.      Palpations: Abdomen is soft.      Tenderness: There is no abdominal tenderness.   Musculoskeletal:      Cervical back: Neck supple.      Right lower leg: No edema.      Left lower leg: No edema.   Lymphadenopathy:      Cervical: No cervical adenopathy.   Skin:     General: Skin is warm.      Findings: No rash.   Neurological:      " General: No focal deficit present.      Mental Status: He is alert. Mental status is at baseline.   Psychiatric:         Mood and Affect: Mood normal.         Behavior: Behavior normal.           Common labs          12/5/2023    08:39 3/11/2024    08:46   Common Labs   Glucose 117  116    BUN 15  18    Creatinine 1.36  1.45    Sodium 142  141    Potassium 4.4  4.1    Chloride 103  103    Calcium 10.1  9.3    Total Protein 7.0  6.6    Albumin 4.7  4.3    Total Bilirubin 0.8  0.6    Alkaline Phosphatase 88  84    AST (SGOT) 27  21    ALT (SGPT) 26  22    Total Cholesterol 193  186    Triglycerides 107  136    HDL Cholesterol 40  42    LDL Cholesterol  134  120    Hemoglobin A1C 6.00  6.10    Microalbumin, Urine  10.3            Diagnoses and all orders for this visit:    1. Encounter for health maintenance examination (Primary)    2. Screen for colon cancer  -     Ambulatory Referral For Screening Colonoscopy       The patient was counseled regarding nutrition, physical activity, healthy weight, injury prevention, misuse of tobacco, alcohol and illicit drugs, mental health, immunizations, and screenings.    Return if symptoms worsen or fail to improve.

## 2024-06-04 ENCOUNTER — TELEPHONE (OUTPATIENT)
Dept: INTERNAL MEDICINE | Facility: CLINIC | Age: 64
End: 2024-06-04
Payer: COMMERCIAL

## 2024-06-04 NOTE — TELEPHONE ENCOUNTER
"  Caller: Eros Ellis \"Devonte\"    Relationship: Self    Best call back number: [phone]          What was the call regarding: PATIENT WANTS TC BEAR BUT PROVIDER IS NOT YET LISTED IN EPIC    I   "

## 2024-06-04 NOTE — TELEPHONE ENCOUNTER
Detailed message left for pt     Hub okay to let pt know that her template will open in July he can schedule then

## 2024-06-18 ENCOUNTER — TELEPHONE (OUTPATIENT)
Dept: GASTROENTEROLOGY | Facility: CLINIC | Age: 64
End: 2024-06-18
Payer: COMMERCIAL

## 2024-06-18 DIAGNOSIS — Z12.11 ENCOUNTER FOR SCREENING FOR MALIGNANT NEOPLASM OF COLON: Primary | ICD-10-CM

## 2024-06-18 RX ORDER — SODIUM CHLORIDE, SODIUM LACTATE, POTASSIUM CHLORIDE, CALCIUM CHLORIDE 600; 310; 30; 20 MG/100ML; MG/100ML; MG/100ML; MG/100ML
30 INJECTION, SOLUTION INTRAVENOUS CONTINUOUS
OUTPATIENT
Start: 2024-06-18

## 2024-06-18 NOTE — TELEPHONE ENCOUNTER
NO RECORD OF C/S    PERSONAL HX OF POLYPS    NO FAMILY HX OF POLYPS    NO FAMILY HX OF COLON CA            LIST OF  MEDICATIONS    VALSARTAN  HYDROCHLOROTHIAZIDE  UNITHROID  ASPRIN            OA QUESTIONNAIRE SCANNED IN MEDIA

## 2024-06-19 ENCOUNTER — TELEPHONE (OUTPATIENT)
Dept: GASTROENTEROLOGY | Facility: CLINIC | Age: 64
End: 2024-06-19
Payer: COMMERCIAL

## 2024-06-19 PROBLEM — Z12.11 ENCOUNTER FOR SCREENING FOR MALIGNANT NEOPLASM OF COLON: Status: ACTIVE | Noted: 2024-06-18

## 2024-06-19 NOTE — TELEPHONE ENCOUNTER
Email sent to hans  for COLONOSCOPY on 7/29/2024  arrive at 11:30  . Sent prep instructions to pt my chart....miralax

## 2024-07-25 NOTE — SIGNIFICANT NOTE
Education provided the Patient on the following:    - Nothing to Eat or Drink after MN the night before the procedure    - Avoid red/purple fluids while completing their bowel prep as ordered by physician  -Contact Gastrointerologist office for any questions about specific details regarding colon prep    -You will need to have someone drive you home after your colonoscopy and remain with you for 24 hours after the procedure  - The date of your Surgery, you may have one visitor at bedside or within 10-15 minutes of [facility] [place]  -Please wear warm socks when you arrive for your colonoscopy  -Remove all jewelry and leave any valuables before arriving the day of your procedure (all will have to be removed before leaving preop)  -You will need to arrive at 1100 on 7-29-24 for your colonoscopy    -Feel free to contact us at: [phone] with any additional questions/concerns

## 2024-07-29 ENCOUNTER — ANESTHESIA (OUTPATIENT)
Dept: SURGERY | Facility: SURGERY CENTER | Age: 64
End: 2024-07-29
Payer: COMMERCIAL

## 2024-07-29 ENCOUNTER — HOSPITAL ENCOUNTER (OUTPATIENT)
Facility: SURGERY CENTER | Age: 64
Setting detail: HOSPITAL OUTPATIENT SURGERY
Discharge: HOME | End: 2024-07-29
Attending: INTERNAL MEDICINE | Admitting: INTERNAL MEDICINE
Payer: COMMERCIAL

## 2024-07-29 ENCOUNTER — ANESTHESIA EVENT (OUTPATIENT)
Dept: SURGERY | Facility: SURGERY CENTER | Age: 64
End: 2024-07-29
Payer: COMMERCIAL

## 2024-07-29 VITALS
HEART RATE: 79 BPM | RESPIRATION RATE: 16 BRPM | WEIGHT: 233 LBS | TEMPERATURE: 98 F | OXYGEN SATURATION: 94 % | DIASTOLIC BLOOD PRESSURE: 70 MMHG | BODY MASS INDEX: 29.9 KG/M2 | SYSTOLIC BLOOD PRESSURE: 128 MMHG | HEIGHT: 74 IN

## 2024-07-29 DIAGNOSIS — Z12.11 ENCOUNTER FOR SCREENING FOR MALIGNANT NEOPLASM OF COLON: ICD-10-CM

## 2024-07-29 DIAGNOSIS — I10 ESSENTIAL HYPERTENSION: ICD-10-CM

## 2024-07-29 PROCEDURE — 25010000002 PROPOFOL 10 MG/ML EMULSION: Performed by: REGISTERED NURSE

## 2024-07-29 PROCEDURE — 45380 COLONOSCOPY AND BIOPSY: CPT | Performed by: INTERNAL MEDICINE

## 2024-07-29 PROCEDURE — 93010 ELECTROCARDIOGRAM REPORT: CPT | Performed by: INTERNAL MEDICINE

## 2024-07-29 PROCEDURE — 88305 TISSUE EXAM BY PATHOLOGIST: CPT | Performed by: INTERNAL MEDICINE

## 2024-07-29 PROCEDURE — 93005 ELECTROCARDIOGRAM TRACING: CPT | Performed by: ANESTHESIOLOGY

## 2024-07-29 PROCEDURE — 25810000003 LACTATED RINGERS PER 1000 ML: Performed by: INTERNAL MEDICINE

## 2024-07-29 RX ORDER — LIDOCAINE HYDROCHLORIDE 20 MG/ML
INJECTION, SOLUTION INFILTRATION; PERINEURAL AS NEEDED
Status: DISCONTINUED | OUTPATIENT
Start: 2024-07-29 | End: 2024-07-29 | Stop reason: SURG

## 2024-07-29 RX ORDER — LIDOCAINE HYDROCHLORIDE 10 MG/ML
0.5 INJECTION, SOLUTION INFILTRATION; PERINEURAL ONCE AS NEEDED
Status: DISCONTINUED | OUTPATIENT
Start: 2024-07-29 | End: 2024-07-29 | Stop reason: HOSPADM

## 2024-07-29 RX ORDER — VALSARTAN 320 MG/1
320 TABLET ORAL DAILY
Qty: 90 TABLET | Refills: 3 | Status: SHIPPED | OUTPATIENT
Start: 2024-07-29

## 2024-07-29 RX ORDER — SODIUM CHLORIDE 0.9 % (FLUSH) 0.9 %
10 SYRINGE (ML) INJECTION AS NEEDED
Status: DISCONTINUED | OUTPATIENT
Start: 2024-07-29 | End: 2024-07-29 | Stop reason: HOSPADM

## 2024-07-29 RX ORDER — SODIUM CHLORIDE, SODIUM LACTATE, POTASSIUM CHLORIDE, CALCIUM CHLORIDE 600; 310; 30; 20 MG/100ML; MG/100ML; MG/100ML; MG/100ML
30 INJECTION, SOLUTION INTRAVENOUS CONTINUOUS
Status: DISCONTINUED | OUTPATIENT
Start: 2024-07-29 | End: 2024-07-29 | Stop reason: HOSPADM

## 2024-07-29 RX ORDER — PROPOFOL 10 MG/ML
VIAL (ML) INTRAVENOUS AS NEEDED
Status: DISCONTINUED | OUTPATIENT
Start: 2024-07-29 | End: 2024-07-29 | Stop reason: SURG

## 2024-07-29 RX ORDER — ONDANSETRON 2 MG/ML
4 INJECTION INTRAMUSCULAR; INTRAVENOUS ONCE AS NEEDED
Status: DISCONTINUED | OUTPATIENT
Start: 2024-07-29 | End: 2024-07-29 | Stop reason: HOSPADM

## 2024-07-29 RX ORDER — SODIUM CHLORIDE, SODIUM LACTATE, POTASSIUM CHLORIDE, CALCIUM CHLORIDE 600; 310; 30; 20 MG/100ML; MG/100ML; MG/100ML; MG/100ML
1000 INJECTION, SOLUTION INTRAVENOUS CONTINUOUS
Status: DISCONTINUED | OUTPATIENT
Start: 2024-07-29 | End: 2024-07-29 | Stop reason: HOSPADM

## 2024-07-29 RX ADMIN — SODIUM CHLORIDE, POTASSIUM CHLORIDE, SODIUM LACTATE AND CALCIUM CHLORIDE 1000 ML: 600; 310; 30; 20 INJECTION, SOLUTION INTRAVENOUS at 11:46

## 2024-07-29 RX ADMIN — PROPOFOL 120 MG: 10 INJECTION, EMULSION INTRAVENOUS at 12:30

## 2024-07-29 RX ADMIN — PROPOFOL 20 MG: 10 INJECTION, EMULSION INTRAVENOUS at 12:31

## 2024-07-29 RX ADMIN — LIDOCAINE HYDROCHLORIDE 100 MG: 20 INJECTION, SOLUTION INFILTRATION; PERINEURAL at 12:30

## 2024-07-29 RX ADMIN — PROPOFOL 160 MCG/KG/MIN: 10 INJECTION, EMULSION INTRAVENOUS at 12:31

## 2024-07-29 NOTE — ANESTHESIA PREPROCEDURE EVALUATION
Anesthesia Evaluation     NPO Solid Status: > 8 hours  NPO Liquid Status: > 2 hours           Airway   Mallampati: II  TM distance: >3 FB  Neck ROM: full  Dental - normal exam     Pulmonary    (-) not a smoker  Cardiovascular     ECG reviewed  Rhythm: regular  Rate: abnormal    (+) hypertension 2 medications or greater, hyperlipidemia    ROS comment: Presents in Bigeminy, no history before  No EKG prior to today  Asymptomatic, exercises several times weekly without chest pain or unreasonable SOA    Neuro/Psych- negative ROS  GI/Hepatic/Renal/Endo    (+) renal disease- CRI, thyroid problem hypothyroidism    Musculoskeletal     Abdominal    Substance History      OB/GYN          Other                          Anesthesia Plan    ASA 3     MAC     (Obtaining 12 lead EKG to document new bigeminy prior to procedure.  Discussed with patient and wife that this could be a new rhythm secondary to electrolyte abnormality or could be chronic, there's no way to know at present. Pt has excellent exercise tolerance, and prep has already taken place, pt and wife are agreeable to proceed at this time given that overall HR is 90's, effective HR at least 40's in that way. Will need outpt follow up with PCP and cardiology to further define this abnormality )  intravenous induction     Anesthetic plan, risks, benefits, and alternatives have been provided, discussed and informed consent has been obtained with: patient and spouse/significant other.        CODE STATUS:

## 2024-07-29 NOTE — ANESTHESIA POSTPROCEDURE EVALUATION
"Patient: Eros Ellis    Procedure Summary       Date: 07/29/24 Room / Location: [facility] / [facility]    Anesthesia Start: 1227 Anesthesia Stop: 1250    Procedure: COLONOSCOPY TO CECUM WITH POLYPECTOMY Diagnosis:       Encounter for screening for malignant neoplasm of colon      (Encounter for screening for malignant neoplasm of colon [Z12.11])    Surgeons: Jh Qiu MD Provider: Donna Oh MD    Anesthesia Type: MAC ASA Status: 3            Anesthesia Type: MAC    Vitals  Vitals Value Taken Time   /70 07/29/24 1302   Temp 36.7 °C (98 °F) 07/29/24 1247   Pulse 79 07/29/24 1302   Resp 16 07/29/24 1302   SpO2 94 % 07/29/24 1302           Post Anesthesia Care and Evaluation    Patient location during evaluation: bedside  Patient participation: complete - patient participated  Level of consciousness: awake  Pain management: adequate    Airway patency: patent  Anesthetic complications: No anesthetic complications    Cardiovascular status: acceptable  Respiratory status: acceptable  Hydration status: acceptable    Comments: /70   Pulse 79   Temp 36.7 °C (98 °F) (Temporal)   Resp 16   Ht 188 cm (74\")   Wt 106 kg (233 lb)   SpO2 94%   BMI 29.92 kg/m²     "

## 2024-07-29 NOTE — H&P
[facility]  Pre Procedure History & Physical    Chief Complaint:   Time for my colonoscopy    Subjective     HPI:   63 y.o. male presenting to endoscopy unit today for screening colonoscopy.    Past Medical History:   Past Medical History:   Diagnosis Date    Benign essential hypertension     Hyperlipidemia     Hypothyroidism 02/2015    Seasonal allergic reaction     Testosterone deficiency 02/2015       Family History:  Family History   Problem Relation Age of Onset    Colon polyps Mother     Parkinsonism Mother     No Known Problems Sister     No Known Problems Sister     No Known Problems Sister     No Known Problems Sister        Social History:   reports that he has never smoked. He has never been exposed to tobacco smoke. He has never used smokeless tobacco. He reports current alcohol use of about 2.0 standard drinks of alcohol per week. He reports that he does not use drugs.    Medications:   Medications Prior to Admission   Medication Sig Dispense Refill Last Dose    Testosterone 20 % cream Apply TWO clicks IN THE MORNING AND ONE click IN THE EVENING TO scrotum       Ascorbic Acid Buffered (BUFFERED VITAMIN C) 1000 MG capsule TAKE ONE Capsule BY MOUTH EVERY DAY  2     aspirin 81 MG tablet Take by mouth daily.       Cholecalciferol (VITAMIN D3) 5000 units tablet tablet ALTERNATE chewing THREE AND TWO TABLETS BY MOUTH EVERY OTHER DAY WITH FOOD  2     hydroCHLOROthiazide (HYDRODIURIL) 25 MG tablet TAKE ONE TABLET BY MOUTH DAILY 90 tablet 4     Multiple Vitamins-Minerals (CLINICAL NUTRIENTS 50-PLUS MEN) tablet TAKE TWO Tablets BY MOUTH TWICE DAILY  2     sildenafil (REVATIO) 20 MG tablet Take 2-3 tablets by MOUTH ONE hour prior TO sexual activity as needed  0     Unithroid 75 MCG tablet TAKE ONE TABLET BY MOUTH DAILY 90 tablet 3     valsartan (DIOVAN) 320 MG tablet TAKE ONE TABLET BY MOUTH DAILY 90 tablet 3        Allergies:  Amlodipine and Erythromycin      Objective     Height 188 cm  "(74\"), weight 108 kg (239 lb).  Physical Exam:   General: patient awake, alert and cooperative    Assessment & Plan     Diagnosis:  Encounter for screening for colon cancer    Anticipated Surgical Procedure:  Colonoscopy    The risks, benefits, and alternatives of this procedure have been discussed with the patient or the responsible party- the patient understands and agrees to proceed.                                                                  "

## 2024-07-30 LAB
LAB AP CASE REPORT: NORMAL
PATH REPORT.FINAL DX SPEC: NORMAL
PATH REPORT.GROSS SPEC: NORMAL
QT INTERVAL: 375 MS
QTC INTERVAL: 484 MS

## 2024-08-04 DIAGNOSIS — I10 ESSENTIAL HYPERTENSION: ICD-10-CM

## 2024-08-05 RX ORDER — VALSARTAN 320 MG/1
320 TABLET ORAL DAILY
Qty: 90 TABLET | Refills: 3 | OUTPATIENT
Start: 2024-08-05

## 2024-08-26 ENCOUNTER — OFFICE VISIT (OUTPATIENT)
Dept: INTERNAL MEDICINE | Facility: CLINIC | Age: 64
End: 2024-08-26
Payer: COMMERCIAL

## 2024-08-26 VITALS
HEIGHT: 74 IN | WEIGHT: 239 LBS | DIASTOLIC BLOOD PRESSURE: 90 MMHG | SYSTOLIC BLOOD PRESSURE: 138 MMHG | HEART RATE: 70 BPM | OXYGEN SATURATION: 96 % | BODY MASS INDEX: 30.67 KG/M2

## 2024-08-26 DIAGNOSIS — R73.03 PREDIABETES: Primary | Chronic | ICD-10-CM

## 2024-08-26 DIAGNOSIS — E03.9 ACQUIRED HYPOTHYROIDISM: Chronic | ICD-10-CM

## 2024-08-26 DIAGNOSIS — E78.2 MIXED HYPERLIPIDEMIA: Chronic | ICD-10-CM

## 2024-08-26 PROCEDURE — 99214 OFFICE O/P EST MOD 30 MIN: CPT | Performed by: STUDENT IN AN ORGANIZED HEALTH CARE EDUCATION/TRAINING PROGRAM

## 2024-08-26 NOTE — PROGRESS NOTES
"Chief Complaint  Hypertension and Hypothyroidism    Subjective        Eros Ellis presents to [facility]  History of Present Illness  This is a 63-year-old male to establish care with chronic medical conditions of hypertension, hypothyroidism, vitamin D deficiency.    Hypertension: Continued on valsartan and hydrochlorothiazide  Hypothyroidism: On 70 mcg Unithyroid daily. He follows with Dr. Miranda for thyroidism  Note he recently had a colonoscopy performed (Dr. Valera) in which a 3 mm polyp was removed.  Pathology consistent with tubular adenoma and no high-grade dysplasia.  Will need a repeat colonoscopy in 2029.    He has no concerns today.  We discussed recommended vaccines.  Typically gets his annual flu vaccine at work.      Objective   Vital Signs:  /90 (BP Location: Left arm, Patient Position: Sitting, Cuff Size: Adult)   Pulse 70   Ht 188 cm (74\")   Wt 108 kg (239 lb)   SpO2 96%   BMI 30.69 kg/m²   Estimated body mass index is 30.69 kg/m² as calculated from the following:    Height as of this encounter: 188 cm (74\").    Weight as of this encounter: 108 kg (239 lb).            Physical Exam  Vitals and nursing note reviewed.   Constitutional:       General: He is not in acute distress.     Appearance: Normal appearance.   Pulmonary:      Effort: Pulmonary effort is normal. No respiratory distress.   Skin:     General: Skin is warm and dry.   Neurological:      Mental Status: He is alert.   Psychiatric:         Mood and Affect: Mood normal.         Judgment: Judgment normal.        Result Review :  The following data was reviewed by: Clotilde Stacy MD on 08/26/2024:  Common labs          12/5/2023    08:39 3/11/2024    08:46   Common Labs   Glucose 117  116    BUN 15  18    Creatinine 1.36  1.45    Sodium 142  141    Potassium 4.4  4.1    Chloride 103  103    Calcium 10.1  9.3    Total Protein 7.0  6.6    Albumin 4.7  4.3    Total Bilirubin 0.8  0.6  "   Alkaline Phosphatase 88  84    AST (SGOT) 27  21    ALT (SGPT) 26  22    Total Cholesterol 193  186    Triglycerides 107  136    HDL Cholesterol 40  42    LDL Cholesterol  134  120    Hemoglobin A1C 6.00  6.10    Microalbumin, Urine  10.3      Data reviewed : Consultant notes gastroenterology           Assessment and Plan   Diagnoses and all orders for this visit:    1. Prediabetes (Primary)  Assessment & Plan:  December 2023 labs as noted above, A1c consistent with prediabetes  Will continue to monitor        2. Mixed hyperlipidemia  Comments:  Review lipid panel at next office visit and calculate ASCVD based on this for further recommendations    3. Acquired hypothyroidism  Assessment & Plan:  April 2024 labs within range  Follows with Dr. Miranda in endocrinology  No medication changes at this time                 Follow Up   Return in about 3 months (around 11/26/2024).  Patient was given instructions and counseling regarding his condition or for health maintenance advice. Please see specific information pulled into the AVS if appropriate.

## 2024-08-26 NOTE — ASSESSMENT & PLAN NOTE
Blood pressure well-controlled at home and in office on hydrochlorothiazide and valsartan  Continue monitoring this at next office visit  Continue current medications

## 2024-08-26 NOTE — ASSESSMENT & PLAN NOTE
April 2024 labs within range  Follows with Dr. Miranda in endocrinology  No medication changes at this time

## 2024-09-05 ENCOUNTER — TELEPHONE (OUTPATIENT)
Dept: GASTROENTEROLOGY | Facility: CLINIC | Age: 64
End: 2024-09-05
Payer: COMMERCIAL

## 2024-09-05 NOTE — TELEPHONE ENCOUNTER
Pt reviewed results via [employer].     Sent pt [employer] msg advising of results and recommendations. Advised to call if any questions.     Colonoscopy placed in  and recall for 7/29/29.

## 2024-09-05 NOTE — TELEPHONE ENCOUNTER
----- Message from Jh Qiu sent at 9/3/2024  6:52 AM EDT -----  The polyp(s) biopsies showed adenomatous change. This is not cancerous but is considered potentially precancerous. Follow-up colonoscopy in 5 years is advised.

## 2024-09-30 ENCOUNTER — PATIENT MESSAGE (OUTPATIENT)
Dept: INTERNAL MEDICINE | Facility: CLINIC | Age: 64
End: 2024-09-30
Payer: COMMERCIAL

## 2024-09-30 DIAGNOSIS — I10 ESSENTIAL HYPERTENSION: ICD-10-CM

## 2024-10-01 DIAGNOSIS — I10 BENIGN ESSENTIAL HYPERTENSION: ICD-10-CM

## 2024-10-01 RX ORDER — HYDROCHLOROTHIAZIDE 25 MG/1
25 TABLET ORAL DAILY
Qty: 90 TABLET | Refills: 0 | Status: SHIPPED | OUTPATIENT
Start: 2024-10-01

## 2024-10-01 NOTE — TELEPHONE ENCOUNTER
Rx Refill Note  Requested Prescriptions     Pending Prescriptions Disp Refills    hydroCHLOROthiazide 25 MG tablet 90 tablet 4     Sig: Take 1 tablet by mouth Daily.      Last office visit with prescribing clinician: 8/26/2024   Last telemedicine visit with prescribing clinician: Visit date not found   Next office visit with prescribing clinician: 11/20/2024                         Would you like a call back once the refill request has been completed: [] Yes [] No    If the office needs to give you a call back, can they leave a voicemail: [] Yes [] No    Mayelin Magaña  10/01/24, 10:40 EDT

## 2024-10-03 RX ORDER — VALSARTAN 320 MG/1
320 TABLET ORAL DAILY
Qty: 90 TABLET | Refills: 0 | Status: SHIPPED | OUTPATIENT
Start: 2024-10-03

## 2024-11-19 NOTE — PROGRESS NOTES
"Chief Complaint  Hypertension    Subjective        Eros Ellis presents to [facility]  Hypertension  This is a chronic problem. The current episode started more than 1 year ago. The problem has been stable since onset. Pertinent negatives include no anxiety, blurred vision, chest pain, headaches, malaise/fatigue, orthopnea, palpitations, peripheral edema or shortness of breath. Agents associated with hypertension include thyroid hormones. There are no compliance problems.      This is a 63-year-old male with chronic medical conditions of hypertension, hypothyroidism, vitamin D deficiency, prediabetes, stage 3a kidney disease who presents for follow-up.  He reports he had labs done at outside facility recently.    Hypertension: This is well-controlled on 25 mg hydrochlorothiazide and 320 mg of valsartan.  Blood pressure mildly elevated office today; at home readings are typically 130s over 80s systolic.  Hypothyroidism: He is on Unithroid 75 mcg daily.  Vitamin D deficiency: He is on 5000 units daily cholecalciferol.  Routine health maintenance: He would like to obtain flu shot while in office today    Objective   Vital Signs:  /82 (BP Location: Right arm, Patient Position: Sitting, Cuff Size: Adult)   Pulse 80   Ht 188 cm (74.02\")   Wt 110 kg (243 lb)   SpO2 97%   BMI 31.19 kg/m²   Estimated body mass index is 31.19 kg/m² as calculated from the following:    Height as of this encounter: 188 cm (74.02\").    Weight as of this encounter: 110 kg (243 lb).            Physical Exam  Vitals and nursing note reviewed.   Constitutional:       General: He is not in acute distress.     Appearance: Normal appearance.   Cardiovascular:      Rate and Rhythm: Normal rate and regular rhythm.   Pulmonary:      Effort: Pulmonary effort is normal.      Breath sounds: Normal breath sounds.   Skin:     General: Skin is warm and dry.   Neurological:      Mental Status: He is alert and " oriented to person, place, and time.   Psychiatric:         Mood and Affect: Mood normal.         Thought Content: Thought content normal.         Judgment: Judgment normal.        Result Review :  The following data was reviewed by: Clotilde Stacy MD on 11/20/2024:  Common labs          12/5/2023    08:39 3/11/2024    08:46   Common Labs   Glucose 117  116    BUN 15  18    Creatinine 1.36  1.45    Sodium 142  141    Potassium 4.4  4.1    Chloride 103  103    Calcium 10.1  9.3    Total Protein 7.0  6.6    Albumin 4.7  4.3    Total Bilirubin 0.8  0.6    Alkaline Phosphatase 88  84    AST (SGOT) 27  21    ALT (SGPT) 26  22    Total Cholesterol 193  186    Triglycerides 107  136    HDL Cholesterol 40  42    LDL Cholesterol  134  120    Hemoglobin A1C 6.00  6.10    Microalbumin, Urine  10.3              Will review labs done at outside facility when patient has a chance to bring these in.    Assessment and Plan   Diagnoses and all orders for this visit:    1. Benign essential hypertension (Primary)  Assessment & Plan:  Hypertension is stable and controlled  Continue current treatment regimen.  Regular aerobic exercise.  Ambulatory blood pressure monitoring.  Blood pressure will be reassessed  next regular office visit .      2. Mixed hyperlipidemia    3. Acquired hypothyroidism    4. Vitamin D deficiency    5. Prediabetes    6. Stage 3a chronic kidney disease    Other orders  -     Fluzone >6mos ([id number])             Follow Up   Return in about 5 months (around 4/20/2025).  Patient was given instructions and counseling regarding his condition or for health maintenance advice. Please see specific information pulled into the AVS if appropriate.             Answers submitted by the patient for this visit:  Primary Reason for Visit (Submitted on 11/13/2024)  What is the primary reason for your visit?: High Blood Pressure

## 2024-11-20 ENCOUNTER — OFFICE VISIT (OUTPATIENT)
Dept: INTERNAL MEDICINE | Facility: CLINIC | Age: 64
End: 2024-11-20
Payer: COMMERCIAL

## 2024-11-20 VITALS
OXYGEN SATURATION: 97 % | BODY MASS INDEX: 31.18 KG/M2 | HEART RATE: 80 BPM | SYSTOLIC BLOOD PRESSURE: 152 MMHG | WEIGHT: 243 LBS | DIASTOLIC BLOOD PRESSURE: 82 MMHG | HEIGHT: 74 IN

## 2024-11-20 DIAGNOSIS — E03.9 ACQUIRED HYPOTHYROIDISM: Chronic | ICD-10-CM

## 2024-11-20 DIAGNOSIS — N18.31 STAGE 3A CHRONIC KIDNEY DISEASE: Chronic | ICD-10-CM

## 2024-11-20 DIAGNOSIS — I10 BENIGN ESSENTIAL HYPERTENSION: Primary | Chronic | ICD-10-CM

## 2024-11-20 DIAGNOSIS — R73.03 PREDIABETES: Chronic | ICD-10-CM

## 2024-11-20 DIAGNOSIS — E55.9 VITAMIN D DEFICIENCY: ICD-10-CM

## 2024-11-20 DIAGNOSIS — E78.2 MIXED HYPERLIPIDEMIA: Chronic | ICD-10-CM

## 2024-11-20 NOTE — ASSESSMENT & PLAN NOTE
Hypertension is stable and controlled  Continue current treatment regimen.  Regular aerobic exercise.  Ambulatory blood pressure monitoring.  Blood pressure will be reassessed  next regular office visit .

## 2024-12-16 DIAGNOSIS — I10 BENIGN ESSENTIAL HYPERTENSION: ICD-10-CM

## 2024-12-16 RX ORDER — HYDROCHLOROTHIAZIDE 25 MG/1
25 TABLET ORAL DAILY
Qty: 90 TABLET | Refills: 0 | OUTPATIENT
Start: 2024-12-16

## 2024-12-19 DIAGNOSIS — I10 BENIGN ESSENTIAL HYPERTENSION: ICD-10-CM

## 2024-12-19 RX ORDER — HYDROCHLOROTHIAZIDE 25 MG/1
25 TABLET ORAL DAILY
Qty: 90 TABLET | Refills: 0 | OUTPATIENT
Start: 2024-12-19

## 2024-12-31 DIAGNOSIS — I10 ESSENTIAL HYPERTENSION: ICD-10-CM

## 2024-12-31 DIAGNOSIS — I10 BENIGN ESSENTIAL HYPERTENSION: ICD-10-CM

## 2024-12-31 RX ORDER — HYDROCHLOROTHIAZIDE 25 MG/1
25 TABLET ORAL DAILY
Qty: 90 TABLET | Refills: 1 | Status: SHIPPED | OUTPATIENT
Start: 2024-12-31

## 2024-12-31 RX ORDER — VALSARTAN 320 MG/1
320 TABLET ORAL DAILY
Qty: 90 TABLET | Refills: 1 | Status: SHIPPED | OUTPATIENT
Start: 2024-12-31

## 2025-03-11 ENCOUNTER — OFFICE VISIT (OUTPATIENT)
Dept: ENDOCRINOLOGY | Age: 65
End: 2025-03-11
Payer: COMMERCIAL

## 2025-03-11 VITALS
OXYGEN SATURATION: 96 % | WEIGHT: 243 LBS | SYSTOLIC BLOOD PRESSURE: 148 MMHG | DIASTOLIC BLOOD PRESSURE: 80 MMHG | HEIGHT: 75 IN | HEART RATE: 82 BPM | BODY MASS INDEX: 30.21 KG/M2

## 2025-03-11 DIAGNOSIS — E03.9 ACQUIRED HYPOTHYROIDISM: Primary | ICD-10-CM

## 2025-03-11 LAB
T4 FREE SERPL-MCNC: 1.17 NG/DL (ref 0.92–1.68)
TSH SERPL DL<=0.005 MIU/L-ACNC: 1.48 UIU/ML (ref 0.27–4.2)

## 2025-03-11 PROCEDURE — 99213 OFFICE O/P EST LOW 20 MIN: CPT | Performed by: INTERNAL MEDICINE

## 2025-03-11 NOTE — PROGRESS NOTES
Chief complaint/Reason for consult: hypothyroidism    HPI:   - 64 year old male here for hypothyroidism  - Last visit was 3/2024  - Interval events since last visit: no hospitalizations or major illnesses  - Is currently on Unithroid 75 mcg daily  - Denies missing any does of  levothyroxine  - Takes her levothyroxine at the same time every day, on an empty stomach, and not with hot beverages    The following portions of the patient's history were reviewed and updated as appropriate: allergies, current medications, past family history, past medical history, past social history, past surgical history, and problem list.      Objective     Vitals:    03/11/25 0805   BP: 148/80   Pulse: 82   SpO2: 96%        Physical Exam  Vitals reviewed.   Constitutional:       Appearance: Normal appearance.   HENT:      Head: Normocephalic and atraumatic.   Eyes:      General: No scleral icterus.  Pulmonary:      Effort: Pulmonary effort is normal. No respiratory distress.   Neurological:      Mental Status: He is alert.      Gait: Gait normal.   Psychiatric:         Mood and Affect: Mood normal.         Behavior: Behavior normal.         Thought Content: Thought content normal.         Judgment: Judgment normal.         Assessment & Plan   Hypothyroidism  - Is on Unithroid 75 mcg daily  - Will check TSH and free T4 today and adjust Unithroid based on labs     - Return to clinic in 1 year

## 2025-03-25 DIAGNOSIS — E03.9 ACQUIRED HYPOTHYROIDISM: ICD-10-CM

## 2025-03-25 RX ORDER — LEVOTHYROXINE SODIUM 75 UG/1
75 TABLET ORAL DAILY
Qty: 90 TABLET | Refills: 3 | OUTPATIENT
Start: 2025-03-25

## 2025-03-25 NOTE — TELEPHONE ENCOUNTER
Rx Refill Note  Requested Prescriptions     Pending Prescriptions Disp Refills    Unithroid 75 MCG tablet [Pharmacy Med Name: UNITHROID 75 MCG TABLET] 90 tablet 3     Sig: TAKE 1 TABLET BY MOUTH DAILY      Last office visit with prescribing clinician: 3/11/2025   Last telemedicine visit with prescribing clinician: Visit date not found   Next office visit with prescribing clinician: 3/11/2026                         Would you like a call back once the refill request has been completed: [] Yes [] No    If the office needs to give you a call back, can they leave a voicemail: [] Yes [] No    Vilma Washburn MA  03/25/25, 07:47 EDT

## 2025-04-23 NOTE — PROGRESS NOTES
"Chief Complaint  Hyperlipidemia (NCC)    Subjective        Eros Ellis presents to [facility]  History of Present Illness  64-year-old male with chronic medical conditions of hypertension, hypothyroidism, vitamin D deficiency, prediabetes, stage 3a kidney disease who presents for follow-up.  He has no concerns coming in today.  He would like a PSA checked - it has been over a year.    Hypertension: This is well-controlled on 25 mg hydrochlorothiazide and 320 mg of valsartan.   Hypothyroidism: He is on Unithroid 75 mcg daily.  Dr. Miranda about 1 month ago with normal labs at that time.  Vitamin D deficiency: He is on 5000 units daily cholecalciferol.      Objective   Vital Signs:  /82 (BP Location: Left arm, Patient Position: Sitting, Cuff Size: Adult)   Pulse 68   Resp 18   Ht 190.5 cm (75\")   Wt 112 kg (247 lb)   SpO2 98%   BMI 30.87 kg/m²   Estimated body mass index is 30.87 kg/m² as calculated from the following:    Height as of this encounter: 190.5 cm (75\").    Weight as of this encounter: 112 kg (247 lb).            Physical Exam  Vitals reviewed.   Constitutional:       General: He is not in acute distress.     Appearance: Normal appearance.   Cardiovascular:      Rate and Rhythm: Normal rate and regular rhythm.      Heart sounds: No murmur heard.  Pulmonary:      Effort: Pulmonary effort is normal. No respiratory distress.      Breath sounds: Normal breath sounds.   Neurological:      Mental Status: He is alert.   Psychiatric:         Mood and Affect: Mood normal.         Thought Content: Thought content normal.         Judgment: Judgment normal.        Result Review :                Assessment and Plan   Diagnoses and all orders for this visit:    1. Vitamin D deficiency (Primary)  -     Cancel: Vitamin D,25-Hydroxy  -     Vitamin D,25-Hydroxy    2. Prediabetes  -     Cancel: Lipid Panel With / Chol / HDL Ratio  -     Cancel: Hemoglobin A1c  -     Hemoglobin " A1c  -     Lipid Panel With / Chol / HDL Ratio    3. Acquired hypothyroidism  -     Cancel: Lipid Panel With / Chol / HDL Ratio  -     Lipid Panel With / Chol / HDL Ratio    4. Benign essential hypertension    5. Stage 3a chronic kidney disease  -     Cancel: Comprehensive Metabolic Panel  -     Cancel: CBC (No Diff)  -     CBC (No Diff)  -     Comprehensive Metabolic Panel    6. Vitamin B12 deficiency  -     Cancel: Vitamin B12  -     Vitamin B12    7. Mixed hyperlipidemia    8. Screening for prostate cancer  -     PSA SCREENING             Follow Up   Return in about 1 year (around 4/25/2026) for Annual physical.  Patient was given instructions and counseling regarding his condition or for health maintenance advice. Please see specific information pulled into the AVS if appropriate.

## 2025-04-25 ENCOUNTER — OFFICE VISIT (OUTPATIENT)
Dept: INTERNAL MEDICINE | Facility: CLINIC | Age: 65
End: 2025-04-25
Payer: COMMERCIAL

## 2025-04-25 VITALS
BODY MASS INDEX: 30.71 KG/M2 | SYSTOLIC BLOOD PRESSURE: 130 MMHG | HEIGHT: 75 IN | OXYGEN SATURATION: 98 % | HEART RATE: 68 BPM | WEIGHT: 247 LBS | RESPIRATION RATE: 18 BRPM | DIASTOLIC BLOOD PRESSURE: 82 MMHG

## 2025-04-25 DIAGNOSIS — E55.9 VITAMIN D DEFICIENCY: Primary | ICD-10-CM

## 2025-04-25 DIAGNOSIS — I10 BENIGN ESSENTIAL HYPERTENSION: Chronic | ICD-10-CM

## 2025-04-25 DIAGNOSIS — N18.31 STAGE 3A CHRONIC KIDNEY DISEASE: Chronic | ICD-10-CM

## 2025-04-25 DIAGNOSIS — R73.03 PREDIABETES: Chronic | ICD-10-CM

## 2025-04-25 DIAGNOSIS — E78.2 MIXED HYPERLIPIDEMIA: Chronic | ICD-10-CM

## 2025-04-25 DIAGNOSIS — E03.9 ACQUIRED HYPOTHYROIDISM: Chronic | ICD-10-CM

## 2025-04-25 DIAGNOSIS — Z12.5 SCREENING FOR PROSTATE CANCER: ICD-10-CM

## 2025-04-25 DIAGNOSIS — E53.8 VITAMIN B12 DEFICIENCY: ICD-10-CM

## 2025-04-25 LAB
25(OH)D3+25(OH)D2 SERPL-MCNC: 46.6 NG/ML (ref 30–100)
ALBUMIN SERPL-MCNC: 4.4 G/DL (ref 3.5–5.2)
ALBUMIN/GLOB SERPL: 1.8 G/DL
ALP SERPL-CCNC: 86 U/L (ref 39–117)
ALT SERPL-CCNC: 30 U/L (ref 1–41)
AST SERPL-CCNC: 30 U/L (ref 1–40)
BILIRUB SERPL-MCNC: 0.6 MG/DL (ref 0–1.2)
BUN SERPL-MCNC: 11 MG/DL (ref 8–23)
BUN/CREAT SERPL: 8.7 (ref 7–25)
CALCIUM SERPL-MCNC: 9.4 MG/DL (ref 8.6–10.5)
CHLORIDE SERPL-SCNC: 102 MMOL/L (ref 98–107)
CHOLEST SERPL-MCNC: 191 MG/DL (ref 0–200)
CHOLEST/HDLC SERPL: 5.03 {RATIO}
CO2 SERPL-SCNC: 27.9 MMOL/L (ref 22–29)
CREAT SERPL-MCNC: 1.27 MG/DL (ref 0.76–1.27)
EGFRCR SERPLBLD CKD-EPI 2021: 63.5 ML/MIN/1.73
ERYTHROCYTE [DISTWIDTH] IN BLOOD BY AUTOMATED COUNT: 13.1 % (ref 12.3–15.4)
GLOBULIN SER CALC-MCNC: 2.5 GM/DL
GLUCOSE SERPL-MCNC: 106 MG/DL (ref 65–99)
HBA1C MFR BLD: 6.3 % (ref 4.8–5.6)
HCT VFR BLD AUTO: 49.2 % (ref 37.5–51)
HDLC SERPL-MCNC: 38 MG/DL (ref 40–60)
HGB BLD-MCNC: 16.1 G/DL (ref 13–17.7)
LDLC SERPL CALC-MCNC: 129 MG/DL (ref 0–100)
MCH RBC QN AUTO: 28.3 PG (ref 26.6–33)
MCHC RBC AUTO-ENTMCNC: 32.7 G/DL (ref 31.5–35.7)
MCV RBC AUTO: 86.5 FL (ref 79–97)
PLATELET # BLD AUTO: 174 10*3/MM3 (ref 140–450)
POTASSIUM SERPL-SCNC: 4.2 MMOL/L (ref 3.5–5.2)
PROT SERPL-MCNC: 6.9 G/DL (ref 6–8.5)
PSA SERPL-MCNC: 2.51 NG/ML (ref 0–4)
RBC # BLD AUTO: 5.69 10*6/MM3 (ref 4.14–5.8)
SODIUM SERPL-SCNC: 140 MMOL/L (ref 136–145)
TRIGL SERPL-MCNC: 130 MG/DL (ref 0–150)
VIT B12 SERPL-MCNC: 500 PG/ML (ref 211–946)
VLDLC SERPL CALC-MCNC: 24 MG/DL (ref 5–40)
WBC # BLD AUTO: 5.28 10*3/MM3 (ref 3.4–10.8)

## 2025-06-26 DIAGNOSIS — I10 BENIGN ESSENTIAL HYPERTENSION: ICD-10-CM

## 2025-06-26 RX ORDER — HYDROCHLOROTHIAZIDE 25 MG/1
25 TABLET ORAL DAILY
Qty: 90 TABLET | Refills: 1 | Status: SHIPPED | OUTPATIENT
Start: 2025-06-26

## (undated) DEVICE — FLEX ADVANTAGE 1500CC: Brand: FLEX ADVANTAGE

## (undated) DEVICE — GAUZE,SPONGE,FLUFF,6"X6.75",STRL,5/TRAY: Brand: MEDLINE

## (undated) DEVICE — Device

## (undated) DEVICE — SINGLE-USE BIOPSY FORCEPS: Brand: RADIAL JAW 4

## (undated) DEVICE — CANN O2 ETCO2 FITS ALL CONN CO2 SMPL A/ 7IN DISP LF

## (undated) DEVICE — GOWN SURG ENDOARMOR LVL3 UNIV KNT/CUF DISP NS

## (undated) DEVICE — GOWN ISOL W/THUMB UNIV BLU BX/15

## (undated) DEVICE — KT ORCA ORCAPOD DISP STRL

## (undated) DEVICE — ADAPT CLN SCPE ENDO PORPOISE BX/50 DISP